# Patient Record
Sex: FEMALE | Race: WHITE | NOT HISPANIC OR LATINO
[De-identification: names, ages, dates, MRNs, and addresses within clinical notes are randomized per-mention and may not be internally consistent; named-entity substitution may affect disease eponyms.]

---

## 2017-01-18 ENCOUNTER — APPOINTMENT (OUTPATIENT)
Dept: VASCULAR SURGERY | Facility: CLINIC | Age: 62
End: 2017-01-18

## 2017-01-18 VITALS — HEIGHT: 63 IN | BODY MASS INDEX: 25.69 KG/M2 | WEIGHT: 145 LBS | RESPIRATION RATE: 16 BRPM | TEMPERATURE: 98.2 F

## 2017-01-18 VITALS — HEART RATE: 58 BPM | DIASTOLIC BLOOD PRESSURE: 76 MMHG | SYSTOLIC BLOOD PRESSURE: 118 MMHG

## 2017-01-18 DIAGNOSIS — Z87.891 PERSONAL HISTORY OF NICOTINE DEPENDENCE: ICD-10-CM

## 2017-01-31 ENCOUNTER — APPOINTMENT (OUTPATIENT)
Dept: CT IMAGING | Facility: CLINIC | Age: 62
End: 2017-01-31

## 2017-01-31 ENCOUNTER — OUTPATIENT (OUTPATIENT)
Dept: OUTPATIENT SERVICES | Facility: HOSPITAL | Age: 62
LOS: 1 days | End: 2017-01-31
Payer: COMMERCIAL

## 2017-01-31 DIAGNOSIS — Z00.8 ENCOUNTER FOR OTHER GENERAL EXAMINATION: ICD-10-CM

## 2017-01-31 PROCEDURE — 70486 CT MAXILLOFACIAL W/O DYE: CPT

## 2017-04-13 ENCOUNTER — APPOINTMENT (OUTPATIENT)
Dept: UROLOGY | Facility: CLINIC | Age: 62
End: 2017-04-13

## 2018-08-29 ENCOUNTER — APPOINTMENT (OUTPATIENT)
Dept: UROLOGY | Facility: CLINIC | Age: 63
End: 2018-08-29

## 2018-08-29 ENCOUNTER — OUTPATIENT (OUTPATIENT)
Dept: OUTPATIENT SERVICES | Facility: HOSPITAL | Age: 63
LOS: 1 days | End: 2018-08-29
Payer: COMMERCIAL

## 2018-08-29 ENCOUNTER — APPOINTMENT (OUTPATIENT)
Dept: CT IMAGING | Facility: CLINIC | Age: 63
End: 2018-08-29
Payer: COMMERCIAL

## 2018-08-29 DIAGNOSIS — Z00.8 ENCOUNTER FOR OTHER GENERAL EXAMINATION: ICD-10-CM

## 2018-08-29 PROCEDURE — 74178 CT ABD&PLV WO CNTR FLWD CNTR: CPT

## 2018-08-29 PROCEDURE — 82565 ASSAY OF CREATININE: CPT

## 2018-08-29 PROCEDURE — 74178 CT ABD&PLV WO CNTR FLWD CNTR: CPT | Mod: 26

## 2020-02-20 ENCOUNTER — APPOINTMENT (OUTPATIENT)
Dept: INTERNAL MEDICINE | Facility: CLINIC | Age: 65
End: 2020-02-20

## 2021-03-01 ENCOUNTER — NON-APPOINTMENT (OUTPATIENT)
Age: 66
End: 2021-03-01

## 2021-03-11 ENCOUNTER — NON-APPOINTMENT (OUTPATIENT)
Age: 66
End: 2021-03-11

## 2021-03-11 ENCOUNTER — APPOINTMENT (OUTPATIENT)
Dept: GYNECOLOGIC ONCOLOGY | Facility: CLINIC | Age: 66
End: 2021-03-11
Payer: COMMERCIAL

## 2021-03-11 VITALS
DIASTOLIC BLOOD PRESSURE: 81 MMHG | BODY MASS INDEX: 25.52 KG/M2 | SYSTOLIC BLOOD PRESSURE: 128 MMHG | HEART RATE: 72 BPM | WEIGHT: 144 LBS | HEIGHT: 63 IN

## 2021-03-11 DIAGNOSIS — R73.03 PREDIABETES.: ICD-10-CM

## 2021-03-11 DIAGNOSIS — R31.29 OTHER MICROSCOPIC HEMATURIA: ICD-10-CM

## 2021-03-11 DIAGNOSIS — Z80.49 FAMILY HISTORY OF MALIGNANT NEOPLASM OF OTHER GENITAL ORGANS: ICD-10-CM

## 2021-03-11 DIAGNOSIS — E78.5 HYPERLIPIDEMIA, UNSPECIFIED: ICD-10-CM

## 2021-03-11 DIAGNOSIS — I83.93 ASYMPTOMATIC VARICOSE VEINS OF BILATERAL LOWER EXTREMITIES: ICD-10-CM

## 2021-03-11 DIAGNOSIS — Z87.42 PERSONAL HISTORY OF OTHER DISEASES OF THE FEMALE GENITAL TRACT: ICD-10-CM

## 2021-03-11 DIAGNOSIS — N28.1 CYST OF KIDNEY, ACQUIRED: ICD-10-CM

## 2021-03-11 DIAGNOSIS — Z86.79 PERSONAL HISTORY OF OTHER DISEASES OF THE CIRCULATORY SYSTEM: ICD-10-CM

## 2021-03-11 PROCEDURE — 99204 OFFICE O/P NEW MOD 45 MIN: CPT | Mod: 25

## 2021-03-11 PROCEDURE — 99072 ADDL SUPL MATRL&STAF TM PHE: CPT

## 2021-03-11 PROCEDURE — 56820 COLPOSCOPY VULVA: CPT

## 2021-03-11 RX ORDER — ICOSAPENT ETHYL 500 MG/1
CAPSULE ORAL
Refills: 0 | Status: ACTIVE | COMMUNITY

## 2021-03-15 ENCOUNTER — RESULT REVIEW (OUTPATIENT)
Age: 66
End: 2021-03-15

## 2021-03-15 ENCOUNTER — OUTPATIENT (OUTPATIENT)
Dept: OUTPATIENT SERVICES | Facility: HOSPITAL | Age: 66
LOS: 1 days | End: 2021-03-15
Payer: COMMERCIAL

## 2021-03-15 DIAGNOSIS — C51.9 MALIGNANT NEOPLASM OF VULVA, UNSPECIFIED: ICD-10-CM

## 2021-03-15 PROCEDURE — 88321 CONSLTJ&REPRT SLD PREP ELSWR: CPT

## 2021-03-16 LAB — SURGICAL PATHOLOGY STUDY: SIGNIFICANT CHANGE UP

## 2021-03-24 ENCOUNTER — OUTPATIENT (OUTPATIENT)
Dept: OUTPATIENT SERVICES | Facility: HOSPITAL | Age: 66
LOS: 1 days | End: 2021-03-24
Payer: COMMERCIAL

## 2021-03-24 VITALS
SYSTOLIC BLOOD PRESSURE: 125 MMHG | OXYGEN SATURATION: 96 % | HEIGHT: 63 IN | HEART RATE: 68 BPM | DIASTOLIC BLOOD PRESSURE: 80 MMHG | RESPIRATION RATE: 18 BRPM | WEIGHT: 143.08 LBS | TEMPERATURE: 98 F

## 2021-03-24 DIAGNOSIS — E11.9 TYPE 2 DIABETES MELLITUS WITHOUT COMPLICATIONS: ICD-10-CM

## 2021-03-24 DIAGNOSIS — C51.9 MALIGNANT NEOPLASM OF VULVA, UNSPECIFIED: ICD-10-CM

## 2021-03-24 DIAGNOSIS — Z29.9 ENCOUNTER FOR PROPHYLACTIC MEASURES, UNSPECIFIED: ICD-10-CM

## 2021-03-24 DIAGNOSIS — Z01.818 ENCOUNTER FOR OTHER PREPROCEDURAL EXAMINATION: ICD-10-CM

## 2021-03-24 LAB
A1C WITH ESTIMATED AVERAGE GLUCOSE RESULT: 6 % — HIGH (ref 4–5.6)
ANION GAP SERPL CALC-SCNC: 14 MMOL/L — SIGNIFICANT CHANGE UP (ref 5–17)
BLD GP AB SCN SERPL QL: NEGATIVE — SIGNIFICANT CHANGE UP
BUN SERPL-MCNC: 13 MG/DL — SIGNIFICANT CHANGE UP (ref 7–23)
CALCIUM SERPL-MCNC: 9.2 MG/DL — SIGNIFICANT CHANGE UP (ref 8.4–10.5)
CHLORIDE SERPL-SCNC: 100 MMOL/L — SIGNIFICANT CHANGE UP (ref 96–108)
CO2 SERPL-SCNC: 24 MMOL/L — SIGNIFICANT CHANGE UP (ref 22–31)
CREAT SERPL-MCNC: 0.54 MG/DL — SIGNIFICANT CHANGE UP (ref 0.5–1.3)
ESTIMATED AVERAGE GLUCOSE: 126 MG/DL — HIGH (ref 68–114)
GLUCOSE SERPL-MCNC: 165 MG/DL — HIGH (ref 70–99)
HCT VFR BLD CALC: 39.9 % — SIGNIFICANT CHANGE UP (ref 34.5–45)
HGB BLD-MCNC: 13.3 G/DL — SIGNIFICANT CHANGE UP (ref 11.5–15.5)
MCHC RBC-ENTMCNC: 29.9 PG — SIGNIFICANT CHANGE UP (ref 27–34)
MCHC RBC-ENTMCNC: 33.3 GM/DL — SIGNIFICANT CHANGE UP (ref 32–36)
MCV RBC AUTO: 89.7 FL — SIGNIFICANT CHANGE UP (ref 80–100)
NRBC # BLD: 0 /100 WBCS — SIGNIFICANT CHANGE UP (ref 0–0)
PLATELET # BLD AUTO: 296 K/UL — SIGNIFICANT CHANGE UP (ref 150–400)
POTASSIUM SERPL-MCNC: 3.8 MMOL/L — SIGNIFICANT CHANGE UP (ref 3.5–5.3)
POTASSIUM SERPL-SCNC: 3.8 MMOL/L — SIGNIFICANT CHANGE UP (ref 3.5–5.3)
RBC # BLD: 4.45 M/UL — SIGNIFICANT CHANGE UP (ref 3.8–5.2)
RBC # FLD: 13.4 % — SIGNIFICANT CHANGE UP (ref 10.3–14.5)
RH IG SCN BLD-IMP: POSITIVE — SIGNIFICANT CHANGE UP
SODIUM SERPL-SCNC: 138 MMOL/L — SIGNIFICANT CHANGE UP (ref 135–145)
WBC # BLD: 4.79 K/UL — SIGNIFICANT CHANGE UP (ref 3.8–10.5)
WBC # FLD AUTO: 4.79 K/UL — SIGNIFICANT CHANGE UP (ref 3.8–10.5)

## 2021-03-24 PROCEDURE — 86850 RBC ANTIBODY SCREEN: CPT

## 2021-03-24 PROCEDURE — 83036 HEMOGLOBIN GLYCOSYLATED A1C: CPT

## 2021-03-24 PROCEDURE — G0463: CPT

## 2021-03-24 PROCEDURE — 80048 BASIC METABOLIC PNL TOTAL CA: CPT

## 2021-03-24 PROCEDURE — 86901 BLOOD TYPING SEROLOGIC RH(D): CPT

## 2021-03-24 PROCEDURE — 85027 COMPLETE CBC AUTOMATED: CPT

## 2021-03-24 PROCEDURE — 86900 BLOOD TYPING SEROLOGIC ABO: CPT

## 2021-03-24 RX ORDER — SODIUM CHLORIDE 9 MG/ML
3 INJECTION INTRAMUSCULAR; INTRAVENOUS; SUBCUTANEOUS EVERY 8 HOURS
Refills: 0 | Status: DISCONTINUED | OUTPATIENT
Start: 2021-04-07 | End: 2021-04-21

## 2021-03-24 RX ORDER — LIDOCAINE HCL 20 MG/ML
0.2 VIAL (ML) INJECTION ONCE
Refills: 0 | Status: DISCONTINUED | OUTPATIENT
Start: 2021-04-07 | End: 2021-04-21

## 2021-03-24 NOTE — H&P PST ADULT - NSICDXPASTMEDICALHX_GEN_ALL_CORE_FT
PAST MEDICAL HISTORY:  Anxiety     Degenerative lumbar disc     Diabetes mellitus     H/O hyperlipidemia     HTN - Hypertension     Hypertriglyceridemia     Vulvar cancer

## 2021-03-24 NOTE — H&P PST ADULT - MUSCULOSKELETAL
ROM intact/no joint swelling/no joint warmth/no calf tenderness/normal strength detailed exam negative

## 2021-03-24 NOTE — H&P PST ADULT - HISTORY OF PRESENT ILLNESS
65 YEAR OLD FEMALE WITH PMH, HLD, HTN, DM WITH MALIGNANT NEOPLASM OF VULVA. SHE PRESENTS TO PAT TODAY FOR EVALUATION PRIOR TO SCHEDULED ERP, PARTIAL SIMPLE VULVECTOMY ON 4/7/2021, SHE DENIES FEVER, CHILLS, PRIOR COVID INFECTION, RECENT TRAVEL OR SICK CONTACTS.    PREOP COVID SCREENING 4/4 AT [facility]

## 2021-03-24 NOTE — H&P PST ADULT - NS PRO REGISTERED ORGAN DONOR
Last med visit 2/14/18-advised follow up HTN in 3 months. Has supervisit sched 6/13/2018  Hydrocodone last ordered 4/23/18-#90 no RF-sig q 8 hrs prn  **see med refill order pended for review** thanks! No

## 2021-03-24 NOTE — H&P PST ADULT - NSICDXPROBLEM_GEN_ALL_CORE_FT
PROBLEM DIAGNOSES  Problem: Malignant neoplasm of vulva  Assessment and Plan: ERP, Partial Simple Vulvectomy  -cbc, bmp, A1c, type and screen done at [facility]  --medical clearance 3/16  -preop instructions  -ERP protocol  -ABO DOS      Problem: Diabetes mellitus  Assessment and Plan: -patient instructed to hold metformin after 4/5 evening dose  -fingerstick DOS    Problem: Need for prophylactic measure  Assessment and Plan: .The Caprini score indicates that this patient is at high risk for a VTE event (score 6 or greater). Surgical patients in this group will benefit from both pharmacologic prophylaxis and intermittent compression devices.  The surgical team will determine the balance between VTE risk and bleeding risk, and other clinical considerations

## 2021-03-24 NOTE — H&P PST ADULT - ASSESSMENT
CHAVAI VTE 2.0 SCORE [CLOT updated 2019]    AGE RELATED RISK FACTORS                                                       MOBILITY RELATED FACTORS  [ ] Age 41-60 years                                            (1 Point)                    [ ] Bed rest                                                        (1 Point)  [x] Age: 61-74 years                                           (2 Points)                  [ ] Plaster cast                                                   (2 Points)  [ ] Age= 75 years                                              (3 Points)                    [ ] Bed bound for more than 72 hours                 (2 Points)    DISEASE RELATED RISK FACTORS                                               GENDER SPECIFIC FACTORS  [ ] Edema in the lower extremities                       (1 Point)              [ ] Pregnancy                                                     (1 Point)  [ ] Varicose veins                                               (1 Point)                     [ ] Post-partum < 6 weeks                                   (1 Point)             [ ] BMI > 25 Kg/m2                                            (1 Point)                     [ ] Hormonal therapy  or oral contraception          (1 Point)                 [ ] Sepsis (in the previous month)                        (1 Point)               [ ] History of pregnancy complications                 (1 point)  [ ] Pneumonia or serious lung disease                                               [ ] Unexplained or recurrent                     (1 Point)           (in the previous month)                               (1 Point)  [ ] Abnormal pulmonary function test                     (1 Point)                 SURGERY RELATED RISK FACTORS  [ ] Acute myocardial infarction                              (1 Point)               [ ]  Section                                             (1 Point)  [ ] Congestive heart failure (in the previous month)  (1 Point)      [ ] Minor surgery                                                  (1 Point)   [ ] Inflammatory bowel disease                             (1 Point)               [ ] Arthroscopic surgery                                        (2 Points)  [ ] Central venous access                                      (2 Points)                [x] General surgery lasting more than 45 minutes (2 points)  [x] Malignancy- Present or previous                   (2 Points)                [ ] Elective arthroplasty                                         (5 points)    [ ] Stroke (in the previous month)                          (5 Points)                                                                                                                                                           HEMATOLOGY RELATED FACTORS                                                 TRAUMA RELATED RISK FACTORS  [ ] Prior episodes of VTE                                     (3 Points)                [ ] Fracture of the hip, pelvis, or leg                       (5 Points)  [ ] Positive family history for VTE                         (3 Points)             [ ] Acute spinal cord injury (in the previous month)  (5 Points)  [ ] Prothrombin 24456 A                                     (3 Points)               [ ] Paralysis  (less than 1 month)                             (5 Points)  [ ] Factor V Leiden                                             (3 Points)                  [ ] Multiple Trauma within 1 month                        (5 Points)  [ ] Lupus anticoagulants                                     (3 Points)                                                           [ ] Anticardiolipin antibodies                               (3 Points)                                                       [ ] High homocysteine in the blood                      (3 Points)                                             [ ] Other congenital or acquired thrombophilia      (3 Points)                                                [ ] Heparin induced thrombocytopenia                  (3 Points)                                     Total Score [ 6]

## 2021-03-24 NOTE — H&P PST ADULT - NSICDXPASTSURGICALHX_GEN_ALL_CORE_FT
PAST SURGICAL HISTORY:  S/P  Section x 2  After 2nd delivery was post partum pre-eclamptic    S/P cone biopsy of cervix benign    S/P D&C x 2  for heavy bleeding    S/P tonsillectomy

## 2021-04-04 ENCOUNTER — OUTPATIENT (OUTPATIENT)
Dept: OUTPATIENT SERVICES | Facility: HOSPITAL | Age: 66
LOS: 1 days | End: 2021-04-04
Payer: COMMERCIAL

## 2021-04-04 DIAGNOSIS — Z11.52 ENCOUNTER FOR SCREENING FOR COVID-19: ICD-10-CM

## 2021-04-04 PROBLEM — C51.9 MALIGNANT NEOPLASM OF VULVA, UNSPECIFIED: Chronic | Status: ACTIVE | Noted: 2021-03-24

## 2021-04-04 PROBLEM — M51.36 OTHER INTERVERTEBRAL DISC DEGENERATION, LUMBAR REGION: Chronic | Status: ACTIVE | Noted: 2021-03-24

## 2021-04-04 PROBLEM — E11.9 TYPE 2 DIABETES MELLITUS WITHOUT COMPLICATIONS: Chronic | Status: ACTIVE | Noted: 2021-03-24

## 2021-04-04 LAB — SARS-COV-2 RNA SPEC QL NAA+PROBE: SIGNIFICANT CHANGE UP

## 2021-04-04 PROCEDURE — C9803: CPT

## 2021-04-04 PROCEDURE — U0005: CPT

## 2021-04-04 PROCEDURE — U0003: CPT

## 2021-04-05 ENCOUNTER — NON-APPOINTMENT (OUTPATIENT)
Age: 66
End: 2021-04-05

## 2021-04-06 ENCOUNTER — TRANSCRIPTION ENCOUNTER (OUTPATIENT)
Age: 66
End: 2021-04-06

## 2021-04-07 ENCOUNTER — RESULT REVIEW (OUTPATIENT)
Age: 66
End: 2021-04-07

## 2021-04-07 ENCOUNTER — APPOINTMENT (OUTPATIENT)
Dept: GYNECOLOGIC ONCOLOGY | Facility: HOSPITAL | Age: 66
End: 2021-04-07

## 2021-04-07 ENCOUNTER — OUTPATIENT (OUTPATIENT)
Dept: OUTPATIENT SERVICES | Facility: HOSPITAL | Age: 66
LOS: 1 days | End: 2021-04-07
Payer: COMMERCIAL

## 2021-04-07 VITALS
HEIGHT: 63 IN | RESPIRATION RATE: 18 BRPM | SYSTOLIC BLOOD PRESSURE: 147 MMHG | DIASTOLIC BLOOD PRESSURE: 89 MMHG | OXYGEN SATURATION: 98 % | HEART RATE: 68 BPM | TEMPERATURE: 98 F | WEIGHT: 143.08 LBS

## 2021-04-07 VITALS
SYSTOLIC BLOOD PRESSURE: 125 MMHG | HEART RATE: 76 BPM | TEMPERATURE: 98 F | RESPIRATION RATE: 18 BRPM | DIASTOLIC BLOOD PRESSURE: 68 MMHG | OXYGEN SATURATION: 99 %

## 2021-04-07 DIAGNOSIS — C51.9 MALIGNANT NEOPLASM OF VULVA, UNSPECIFIED: ICD-10-CM

## 2021-04-07 LAB — GLUCOSE BLDC GLUCOMTR-MCNC: 141 MG/DL — HIGH (ref 70–99)

## 2021-04-07 PROCEDURE — 86901 BLOOD TYPING SEROLOGIC RH(D): CPT

## 2021-04-07 PROCEDURE — 56620 VULVECTOMY SIMPLE PARTIAL: CPT

## 2021-04-07 PROCEDURE — 88307 TISSUE EXAM BY PATHOLOGIST: CPT | Mod: 26

## 2021-04-07 PROCEDURE — 88342 IMHCHEM/IMCYTCHM 1ST ANTB: CPT | Mod: 26

## 2021-04-07 PROCEDURE — 86900 BLOOD TYPING SEROLOGIC ABO: CPT

## 2021-04-07 PROCEDURE — 88305 TISSUE EXAM BY PATHOLOGIST: CPT

## 2021-04-07 PROCEDURE — 88342 IMHCHEM/IMCYTCHM 1ST ANTB: CPT

## 2021-04-07 PROCEDURE — 88305 TISSUE EXAM BY PATHOLOGIST: CPT | Mod: 26

## 2021-04-07 PROCEDURE — 86850 RBC ANTIBODY SCREEN: CPT

## 2021-04-07 PROCEDURE — 88307 TISSUE EXAM BY PATHOLOGIST: CPT

## 2021-04-07 PROCEDURE — 82962 GLUCOSE BLOOD TEST: CPT

## 2021-04-07 RX ORDER — VALACYCLOVIR 500 MG/1
0 TABLET, FILM COATED ORAL
Qty: 0 | Refills: 0 | DISCHARGE

## 2021-04-07 RX ORDER — ATENOLOL 25 MG/1
0 TABLET ORAL
Qty: 0 | Refills: 0 | DISCHARGE

## 2021-04-07 RX ORDER — IBUPROFEN 200 MG
1 TABLET ORAL
Qty: 0 | Refills: 0 | DISCHARGE

## 2021-04-07 RX ORDER — CELECOXIB 200 MG/1
400 CAPSULE ORAL ONCE
Refills: 0 | Status: COMPLETED | OUTPATIENT
Start: 2021-04-07 | End: 2021-04-07

## 2021-04-07 RX ORDER — ONDANSETRON 8 MG/1
4 TABLET, FILM COATED ORAL ONCE
Refills: 0 | Status: DISCONTINUED | OUTPATIENT
Start: 2021-04-07 | End: 2021-04-21

## 2021-04-07 RX ORDER — ACETAMINOPHEN 500 MG
1000 TABLET ORAL ONCE
Refills: 0 | Status: COMPLETED | OUTPATIENT
Start: 2021-04-07 | End: 2021-04-07

## 2021-04-07 RX ORDER — BACILLUS COAGULANS/INULIN 1B-250 MG
1 CAPSULE ORAL
Qty: 0 | Refills: 0 | DISCHARGE

## 2021-04-07 RX ORDER — ACETAMINOPHEN 500 MG
3 TABLET ORAL
Qty: 0 | Refills: 0 | DISCHARGE

## 2021-04-07 RX ORDER — SODIUM CHLORIDE 9 MG/ML
1000 INJECTION, SOLUTION INTRAVENOUS
Refills: 0 | Status: DISCONTINUED | OUTPATIENT
Start: 2021-04-07 | End: 2021-04-21

## 2021-04-07 RX ORDER — ATORVASTATIN CALCIUM 80 MG/1
1 TABLET, FILM COATED ORAL
Qty: 0 | Refills: 0 | DISCHARGE

## 2021-04-07 RX ORDER — IRBESARTAN 75 MG/1
0 TABLET ORAL
Qty: 0 | Refills: 0 | DISCHARGE

## 2021-04-07 RX ORDER — ICOSAPENT ETHYL 500 MG/1
2 CAPSULE, LIQUID FILLED ORAL
Qty: 0 | Refills: 0 | DISCHARGE

## 2021-04-07 RX ORDER — BACITRACIN ZINC 500 UNIT/G
1 OINTMENT IN PACKET (EA) TOPICAL
Qty: 0 | Refills: 0 | DISCHARGE

## 2021-04-07 RX ORDER — GABAPENTIN 400 MG/1
300 CAPSULE ORAL ONCE
Refills: 0 | Status: COMPLETED | OUTPATIENT
Start: 2021-04-07 | End: 2021-04-07

## 2021-04-07 RX ORDER — CEFAZOLIN SODIUM 1 G
2000 VIAL (EA) INJECTION ONCE
Refills: 0 | Status: COMPLETED | OUTPATIENT
Start: 2021-04-07 | End: 2021-04-07

## 2021-04-07 RX ORDER — METFORMIN HYDROCHLORIDE 850 MG/1
1 TABLET ORAL
Qty: 0 | Refills: 0 | DISCHARGE

## 2021-04-07 RX ORDER — POTASSIUM CHLORIDE 20 MEQ
2 PACKET (EA) ORAL
Qty: 0 | Refills: 0 | DISCHARGE

## 2021-04-07 RX ADMIN — CELECOXIB 400 MILLIGRAM(S): 200 CAPSULE ORAL at 11:57

## 2021-04-07 RX ADMIN — Medication 1000 MILLIGRAM(S): at 11:56

## 2021-04-07 RX ADMIN — GABAPENTIN 300 MILLIGRAM(S): 400 CAPSULE ORAL at 11:57

## 2021-04-07 NOTE — ASU PATIENT PROFILE, ADULT - PMH
Anxiety    Degenerative lumbar disc    Diabetes mellitus    H/O hyperlipidemia    HTN - Hypertension    Hypertriglyceridemia    Vulvar cancer

## 2021-04-07 NOTE — ASU PATIENT PROFILE, ADULT - PSH
S/P  Section  x 2  After 2nd delivery was post partum pre-eclamptic  S/P cone biopsy of cervix  benign  S/P D&C  x 2  for heavy bleeding  S/P tonsillectomy

## 2021-04-07 NOTE — ASU DISCHARGE PLAN (ADULT/PEDIATRIC) - CARE PROVIDER_API CALL
Jessica Givens)  Gynecologic Oncology; Obstetrics and Gynecology  [address] [place] [address]  Phone: [phone]  Fax: [phone]  Follow Up Time:

## 2021-04-07 NOTE — ASU DISCHARGE PLAN (ADULT/PEDIATRIC) - ASU DC SPECIAL INSTRUCTIONSFT
Regular diet as tolerated, regular activity as tolerated. Nothing per vagina: no intercourse, tampons or douching.  Call your provider if you experience fevers, chills, worsening abdominal pain, inability to urinate or worsening vaginal bleeding.    Alternate using Motrin 600mg every 6 hours and Tylenol 975mg (2 extra strength or 3 regular tabs) every 6 hours for pain.     Please apply the bacitracin ointment provided to the surgical site 3 to 4 times per day or after using the bathroom.

## 2021-04-07 NOTE — ASU PREOP CHECKLIST - SELECT TESTS ORDERED
Type and Screen/POCT Blood Glucose finger stick glucose 141 type and screen sent stat/Type and Screen/POCT Blood Glucose finger stick glucose 141 Dr Melton aware type and screen sent stat/Type and Screen/POCT Blood Glucose

## 2021-04-09 ENCOUNTER — NON-APPOINTMENT (OUTPATIENT)
Age: 66
End: 2021-04-09

## 2021-04-09 ENCOUNTER — EMERGENCY (EMERGENCY)
Facility: HOSPITAL | Age: 66
LOS: 1 days | Discharge: HOME | End: 2021-04-09
Attending: EMERGENCY MEDICINE
Payer: COMMERCIAL

## 2021-04-09 VITALS
DIASTOLIC BLOOD PRESSURE: 112 MMHG | HEIGHT: 63 IN | SYSTOLIC BLOOD PRESSURE: 192 MMHG | TEMPERATURE: 98 F | WEIGHT: 139.99 LBS | HEART RATE: 74 BPM | OXYGEN SATURATION: 98 % | RESPIRATION RATE: 20 BRPM

## 2021-04-09 PROCEDURE — 99285 EMERGENCY DEPT VISIT HI MDM: CPT

## 2021-04-09 PROCEDURE — 93010 ELECTROCARDIOGRAM REPORT: CPT

## 2021-04-09 NOTE — ED ADULT TRIAGE NOTE - CHIEF COMPLAINT QUOTE
elev bp and dizziness since 730pm, nausea, dry mouth and diarrhea  denies weakness/cp/sob  negative BEFAST

## 2021-04-09 NOTE — ED ADULT TRIAGE NOTE - HEIGHT IN CM
Illness Evaluation                  2/12/2021     ALLERGIES:   Allergen Reactions   • Sulfa Antibiotics HIVES          • Adhesive   (Environmental) HIVES            Current Outpatient Medications   Medication Sig Dispense Refill   • tramadol-acetaminophen (ULTRACET) 37.5-325 MG per tablet Take 1 tablet by mouth every 6 hours as needed for Pain. 30 tablet 0   • gabapentin (NEURONTIN) 100 MG capsule Take 1 capsule by mouth 2 times daily. 60 capsule 3   • LORazepam (ATIVAN) 0.5 MG tablet Take 1 tablet by mouth daily as needed for Anxiety. 30 tablet 3   • MULTIPLE VITAMIN PO Take by mouth daily.     • Ascorbic Acid (VITAMIN C) 100 MG tablet Take by mouth daily.     • Digestive Enzymes Cap Take by mouth as needed.      • Omega-3 Fatty Acids (FISH OIL) 500 MG capsule Take by mouth daily.     • omeprazole (PRILOSEC) 20 MG capsule Take by mouth daily.        No current facility-administered medications for this visit.      Patient Active Problem List   Diagnosis   • Anxiety   • Diverticulitis of intestine   • Emphysematous bleb of lung (CMS/HCC)   • Gastroesophageal reflux disease   • Hodgkin's disease (CMS/HCC)   • Hyperlipidemia   • Rectovaginal fistula     Subjective:  Ashlyn Denney is a 66 year old female who presents today with a complaint of rib pain which has been present for weeks. Symptoms are worsening. Present treatments: none.  On 11/4/20 went to chiropractor. Her normal chiro was off that day. Sites that the substitute chiro fractured her ribs. 2/3/21 had a different chiro again adjusted her. She notes to have a a flared up problems in her upper back and ribs. Pain with bending over, laying on side. Ok when standing up. Has not adjusted upper back prior to 2/3/21. Pain all the way around her torso.   Tried Tylenol extra strength without help, has not tried any NSAIDs siting that she gets a 'blood venancio' on her face so she avoids (of note, this writer is not clear what patient is referring to).  Goes to [facility] out of [place] .  Xrays taken on 2/3/21 and again sometime between then and now. No fracture at this time shown.     Needs note for work.     Review of Systems  Objective:  /70   Pulse (!) 109   Temp 96.8 °F (36 °C) (Temporal)   Ht 5' 4\" (1.626 m)   Wt 79.4 kg (175 lb)   LMP  (LMP Unknown)   BMI 30.04 kg/m²   BSA 1.85 m²   Physical Exam  Constitutional:       General: She is not in acute distress.     Appearance: She is well-developed. She is not ill-appearing.   HENT:      Head: Normocephalic.      Mouth/Throat:      Mouth: Mucous membranes are not pale and not dry.   Eyes:      General: Lids are normal.      Extraocular Movements:      Right eye: Normal extraocular motion.      Left eye: Normal extraocular motion.   Neck:      Musculoskeletal: Normal range of motion.   Skin:     General: Skin is warm.   Neurological:      Mental Status: She is alert.   Psychiatric:         Mood and Affect: Mood is not anxious or depressed.         Speech: Speech normal. Speech is not slurred.         Behavior: Behavior normal. Behavior is cooperative.     Assessment/Plan:  Back/torso pain -mostly managed through chiropractic office with a couple of communications through this office with regards to medication refill and 1 telephone message dated 11/18/2020 after initial injury.  Initially patient states that she wants to be off work for several more weeks.  Discussed with patient that, at this time that would not be appropriate as this is the first time that we are dealing with this issue from her most recent flare up on February 3.  Patient has not worked since February 3.  Did write patient note to be off work today for appointment.  Patient still wanting to to care through chiropractor's office.  Strongly recommended that patient no longer seek adjustments. It would appear to this writer that she would not be a good candidate for adjustments as she has experienced a broken rib back in November from this  and, more recently, had a \"flare up\" of previous injury after an adjustment.  If she is asking for extended time off of work this would need to come, at this time, from the treating office (chiropractor).  Discussed with patient that if chiropractor does not feel comfortable writing this note that we can take over care however she will need to follow our guidelines of seeing physical therapy and likely pain management.  Lengthy discussion with patient regarding pain management.  Patient is very concerned that she will be in pain.  Requesting tramadol.  Discussed with patient that tramadol is not our first go to in pain management, especially when other, less potent modalities have not been attempted like ibuprofen.  Review of records show that patient has a prescription for meloxicam from her orthopedic doctor.  Patient notes that she has not taken any of this.  Discussed with patient that this would be the first step in helping to reduce her inflammation and pain.  Patient concerned that meloxicam is not specifically a pain medication and lengthy discussion was had.   Did offer patient 10 pills of tramadol-acetaminophen to be taken at nighttime only as needed.  During the day recommended the meloxicam and if she is not taking the meloxicam recommended ibuprofen or acetaminophen.   She is to contact the office if she no longer wishes to pursue only chiropractic care for her pain.  Neck step would be to see physical therapy and possibly pain management.  Stressed to patient several times that it does not appear that she is a good candidate for manipulation.  Discussed that if she would like to go there for ultrasound therapy, stretching, muscle stim that that may be more appropriate but would strongly and highly recommend that she not get chiropractic adjustments.  Instructed to call if the problem worsens or does not improve.  Schedule follow-up: With chiropractic office unless she wishes to start care thru  [facility].  May contact our office if she would like to start care through us and we will start that process.  Kathie De La Cruz PA-C 2/12/2021 10:07 AM    Provider billing: Supervising Provider: ROBERTO    160.02

## 2021-04-10 VITALS
OXYGEN SATURATION: 98 % | DIASTOLIC BLOOD PRESSURE: 72 MMHG | TEMPERATURE: 98 F | RESPIRATION RATE: 16 BRPM | HEART RATE: 62 BPM | SYSTOLIC BLOOD PRESSURE: 136 MMHG

## 2021-04-10 LAB
ALBUMIN SERPL ELPH-MCNC: 5 G/DL — SIGNIFICANT CHANGE UP (ref 3.3–5)
ALP SERPL-CCNC: 47 U/L — SIGNIFICANT CHANGE UP (ref 40–120)
ALT FLD-CCNC: 39 U/L — SIGNIFICANT CHANGE UP (ref 10–45)
ANION GAP SERPL CALC-SCNC: 14 MMOL/L — SIGNIFICANT CHANGE UP (ref 5–17)
APTT BLD: 29.7 SEC — SIGNIFICANT CHANGE UP (ref 27.5–35.5)
AST SERPL-CCNC: 29 U/L — SIGNIFICANT CHANGE UP (ref 10–40)
BASE EXCESS BLDV CALC-SCNC: 3.6 MMOL/L — HIGH (ref -2–2)
BASOPHILS # BLD AUTO: 0.04 K/UL — SIGNIFICANT CHANGE UP (ref 0–0.2)
BASOPHILS NFR BLD AUTO: 0.7 % — SIGNIFICANT CHANGE UP (ref 0–2)
BILIRUB SERPL-MCNC: 1.1 MG/DL — SIGNIFICANT CHANGE UP (ref 0.2–1.2)
BUN SERPL-MCNC: 14 MG/DL — SIGNIFICANT CHANGE UP (ref 7–23)
CALCIUM SERPL-MCNC: 9.7 MG/DL — SIGNIFICANT CHANGE UP (ref 8.4–10.5)
CHLORIDE SERPL-SCNC: 98 MMOL/L — SIGNIFICANT CHANGE UP (ref 96–108)
CO2 BLDV-SCNC: 30 MMOL/L — SIGNIFICANT CHANGE UP (ref 22–30)
CO2 SERPL-SCNC: 25 MMOL/L — SIGNIFICANT CHANGE UP (ref 22–31)
CREAT SERPL-MCNC: 0.53 MG/DL — SIGNIFICANT CHANGE UP (ref 0.5–1.3)
EOSINOPHIL # BLD AUTO: 0.07 K/UL — SIGNIFICANT CHANGE UP (ref 0–0.5)
EOSINOPHIL NFR BLD AUTO: 1.1 % — SIGNIFICANT CHANGE UP (ref 0–6)
GAS PNL BLDV: SIGNIFICANT CHANGE UP
GLUCOSE SERPL-MCNC: 176 MG/DL — HIGH (ref 70–99)
HCO3 BLDV-SCNC: 29 MMOL/L — SIGNIFICANT CHANGE UP (ref 21–29)
HCT VFR BLD CALC: 42.6 % — SIGNIFICANT CHANGE UP (ref 34.5–45)
HGB BLD-MCNC: 14.3 G/DL — SIGNIFICANT CHANGE UP (ref 11.5–15.5)
IMM GRANULOCYTES NFR BLD AUTO: 0.2 % — SIGNIFICANT CHANGE UP (ref 0–1.5)
INR BLD: 0.9 RATIO — SIGNIFICANT CHANGE UP (ref 0.88–1.16)
LYMPHOCYTES # BLD AUTO: 2.01 K/UL — SIGNIFICANT CHANGE UP (ref 1–3.3)
LYMPHOCYTES # BLD AUTO: 32.9 % — SIGNIFICANT CHANGE UP (ref 13–44)
MCHC RBC-ENTMCNC: 30.4 PG — SIGNIFICANT CHANGE UP (ref 27–34)
MCHC RBC-ENTMCNC: 33.6 GM/DL — SIGNIFICANT CHANGE UP (ref 32–36)
MCV RBC AUTO: 90.6 FL — SIGNIFICANT CHANGE UP (ref 80–100)
MONOCYTES # BLD AUTO: 0.45 K/UL — SIGNIFICANT CHANGE UP (ref 0–0.9)
MONOCYTES NFR BLD AUTO: 7.4 % — SIGNIFICANT CHANGE UP (ref 2–14)
NEUTROPHILS # BLD AUTO: 3.53 K/UL — SIGNIFICANT CHANGE UP (ref 1.8–7.4)
NEUTROPHILS NFR BLD AUTO: 57.7 % — SIGNIFICANT CHANGE UP (ref 43–77)
NRBC # BLD: 0 /100 WBCS — SIGNIFICANT CHANGE UP (ref 0–0)
PCO2 BLDV: 49 MMHG — HIGH (ref 39–42)
PH BLDV: 7.39 — SIGNIFICANT CHANGE UP (ref 7.35–7.45)
PLATELET # BLD AUTO: 322 K/UL — SIGNIFICANT CHANGE UP (ref 150–400)
PO2 BLDV: 20 MMHG — LOW (ref 25–45)
POTASSIUM SERPL-MCNC: 4 MMOL/L — SIGNIFICANT CHANGE UP (ref 3.5–5.3)
POTASSIUM SERPL-SCNC: 4 MMOL/L — SIGNIFICANT CHANGE UP (ref 3.5–5.3)
PROT SERPL-MCNC: 7.5 G/DL — SIGNIFICANT CHANGE UP (ref 6–8.3)
PROTHROM AB SERPL-ACNC: 10.9 SEC — SIGNIFICANT CHANGE UP (ref 10.6–13.6)
RBC # BLD: 4.7 M/UL — SIGNIFICANT CHANGE UP (ref 3.8–5.2)
RBC # FLD: 13.2 % — SIGNIFICANT CHANGE UP (ref 10.3–14.5)
SAO2 % BLDV: 29 % — LOW (ref 67–88)
SODIUM SERPL-SCNC: 137 MMOL/L — SIGNIFICANT CHANGE UP (ref 135–145)
TROPONIN T, HIGH SENSITIVITY RESULT: <6 NG/L — SIGNIFICANT CHANGE UP (ref 0–51)
WBC # BLD: 6.11 K/UL — SIGNIFICANT CHANGE UP (ref 3.8–10.5)
WBC # FLD AUTO: 6.11 K/UL — SIGNIFICANT CHANGE UP (ref 3.8–10.5)

## 2021-04-10 PROCEDURE — 70450 CT HEAD/BRAIN W/O DYE: CPT

## 2021-04-10 PROCEDURE — 70450 CT HEAD/BRAIN W/O DYE: CPT | Mod: 26,MA,59

## 2021-04-10 PROCEDURE — 82962 GLUCOSE BLOOD TEST: CPT

## 2021-04-10 PROCEDURE — 99284 EMERGENCY DEPT VISIT MOD MDM: CPT | Mod: 25

## 2021-04-10 PROCEDURE — 80053 COMPREHEN METABOLIC PANEL: CPT

## 2021-04-10 PROCEDURE — 84484 ASSAY OF TROPONIN QUANT: CPT

## 2021-04-10 PROCEDURE — 85610 PROTHROMBIN TIME: CPT

## 2021-04-10 PROCEDURE — 96374 THER/PROPH/DIAG INJ IV PUSH: CPT | Mod: XU

## 2021-04-10 PROCEDURE — 83735 ASSAY OF MAGNESIUM: CPT

## 2021-04-10 PROCEDURE — 85730 THROMBOPLASTIN TIME PARTIAL: CPT

## 2021-04-10 PROCEDURE — 84100 ASSAY OF PHOSPHORUS: CPT

## 2021-04-10 PROCEDURE — 70498 CT ANGIOGRAPHY NECK: CPT | Mod: 26,MA

## 2021-04-10 PROCEDURE — 93005 ELECTROCARDIOGRAM TRACING: CPT

## 2021-04-10 PROCEDURE — 83690 ASSAY OF LIPASE: CPT

## 2021-04-10 PROCEDURE — 70498 CT ANGIOGRAPHY NECK: CPT

## 2021-04-10 PROCEDURE — 70496 CT ANGIOGRAPHY HEAD: CPT | Mod: 26,MA

## 2021-04-10 PROCEDURE — 70496 CT ANGIOGRAPHY HEAD: CPT

## 2021-04-10 PROCEDURE — 83036 HEMOGLOBIN GLYCOSYLATED A1C: CPT

## 2021-04-10 PROCEDURE — 85025 COMPLETE CBC W/AUTO DIFF WBC: CPT

## 2021-04-10 PROCEDURE — 82803 BLOOD GASES ANY COMBINATION: CPT

## 2021-04-10 RX ORDER — SODIUM CHLORIDE 9 MG/ML
1000 INJECTION, SOLUTION INTRAVENOUS ONCE
Refills: 0 | Status: COMPLETED | OUTPATIENT
Start: 2021-04-10 | End: 2021-04-10

## 2021-04-10 RX ORDER — ONDANSETRON 8 MG/1
4 TABLET, FILM COATED ORAL ONCE
Refills: 0 | Status: COMPLETED | OUTPATIENT
Start: 2021-04-10 | End: 2021-04-10

## 2021-04-10 RX ADMIN — ONDANSETRON 4 MILLIGRAM(S): 8 TABLET, FILM COATED ORAL at 01:06

## 2021-04-10 RX ADMIN — SODIUM CHLORIDE 1000 MILLILITER(S): 9 INJECTION, SOLUTION INTRAVENOUS at 01:06

## 2021-04-10 NOTE — ED PROVIDER NOTE - ATTENDING CONTRIBUTION TO CARE
------------ATTENDING NOTE------------   pt c/o dizziness, describing feeling lightheaded, associated w/ nausea and decreased PO, had looser nonbloody stools, no fevers, mild epigastric discomfort/burning, felt palpitations describes as gradually increasing rate, no chest pain or sob, Code Stroke by triage, cleared Neuro, feels significantly better -->  - Mark Parisi MD   ----------------------------------------------

## 2021-04-10 NOTE — ED PROVIDER NOTE - OBJECTIVE STATEMENT
65 yoF, PMHx of HTN, vulvar CA, DM, anxiety presenting to ED for dizziness, diarrhea, nausea. Activated as code stroke for dizziness. Seen after neuro eval/neuro imaging.  Pt states started to feel dizzy around 20:00. Had several episodes of diarrhea. Nausea without vomiting. Denies any chest, back, abd pain. Denies any focal neuro sx. No vertigo. Able to ambulate. Felt well recently and this am. Had takeout dinner, tuna from restaurant. No hx of TIA/CVA. Follows regularly with doctors including cardiology for routine screening with reassuring testing in the past. Upon my eval feels much improved. Denies any urinary sx. Did have vulvar bx sometime earlier this month with GYN. No major complications.

## 2021-04-10 NOTE — CONSULT NOTE ADULT - ASSESSMENT
Assessment: 65 year old right-handed F with a PMH of HTN and borderline DM who presented on 4/10 with lightheadedness, nausea, and vomiting. LKN 20:00 on 4/9. Initial vital signs significant for BP of 192/112. Physical exam nonfocal. Patient not a tPA candidate due to mild nondisabling deficits. Patient not an endovascular candidate due to no LVO.      Assessment: 65 year old right-handed F with a PMH of HTN and borderline DM who presented on 4/10 with lightheadedness, nausea, and vomiting. LKN 20:00 on 4/9. Initial vital signs significant for BP of 192/112. Physical exam nonfocal. Patient not a tPA candidate due to mild nondisabling deficits. Patient not an endovascular candidate due to no LVO.     Impression: Lightheadedness/nausea possibly from hypertensive urgency, cardiogenic etiology. Low suspicion for acute neurovascular event    Plan:  Investigation of lightheadedness/nausea/diarrhea per primary team  Consider checking orthostatics, CBC/CMP, Mg, Phos, EKG  No further inpatient neurologic workup at this time    Case to be discussed with neurology attending, Dr. Clark     Assessment: 65 year old right-handed F with a PMH of HTN and borderline DM who presented on 4/10 with lightheadedness, nausea, and vomiting. LKN 20:00 on 4/9. Initial vital signs significant for BP of 192/112. Physical exam nonfocal. Patient not a tPA candidate due to mild nondisabling deficits. Patient not an endovascular candidate due to no LVO.     Impression: Lightheadedness/nausea possibly from hypertensive urgency, cardiogenic etiology. Low suspicion for acute neurovascular event    Plan:  Investigation of lightheadedness/nausea/vomiting per primary team  Consider checking orthostatics, CBC/CMP, Mg, Phos, EKG  No further inpatient neurologic workup at this time    Case discussed with neurology attending, Dr. Kamara

## 2021-04-10 NOTE — ED PROVIDER NOTE - NSFOLLOWUPINSTRUCTIONS_ED_ALL_ED_FT
See your Primary Doctor this week for follow up -- call to discuss.    Stay well hydrated, eat regular healthy meals, get plenty of rest.    See NEAR SYNCOPE and DYSPEPSIA information and return instructions given to you.    Seek immediate medical care for new/worsening symptoms/concerns.

## 2021-04-10 NOTE — ED PROVIDER NOTE - PROGRESS NOTE DETAILS
Alma Delia, PGY3- workup reassuring. Tolerating PO. Stable for d/c. Advised on gastritis treatment. Given return precautions.

## 2021-04-10 NOTE — CONSULT NOTE ADULT - SUBJECTIVE AND OBJECTIVE BOX
HPI: 65 year old right-handed F with a PMH of HTN and borderline DM who presented on 4/10 with lightheadedness, nausea, and vomiting. LKN 20:00 on . She found that her blood pressure was elevated at home. Patient denies weakness, numbness, vision changes, dysarthria, or headache.       NIHSS: 0  MRS: 0    REVIEW OF SYSTEMS  A 10-system ROS was performed and is negative except for those items noted above and/or in the HPI.    PAST MEDICAL & SURGICAL HISTORY:  H/O hyperlipidemia    HTN - Hypertension    Hypertriglyceridemia    Anxiety    Diabetes mellitus    Vulvar cancer    Degenerative lumbar disc    S/P  Section  x 2  After 2nd delivery was post partum pre-eclamptic    S/P D&amp;C  x 2  for heavy bleeding    S/P tonsillectomy    S/P cone biopsy of cervix  benign      FAMILY HISTORY:    SOCIAL HISTORY:   T/E/D:   Occupation:   Lives with:     MEDICATIONS (HOME):  Home Medications:  atenolol 50 mg oral tablet: orally 2 times a day (2021 11:53)  atorvastatin 40 mg oral tablet: 1 tab(s) orally once a day at bedtime (2021 11:53)  bacitracin 500 units/g topical ointment: Apply topically to affected area 3 to 4 times a day (2021 14:26)  clobetasol 0.05% topical ointment: Apply topically to affected area 2 times a week (2021 11:53)  estradiol 0.1 mg/24 hours twice weekly transdermal film, extended release: 1 patch transdermal 2 times a week (2021 11:53)  irbesartan 75 mg oral tablet: orally once a day (at bedtime) (2021 11:53)  metFORMIN 750 mg oral tablet, extended release: 1 tab(s) orally once a day (2021 11:53)  Motrin 600 mg oral tablet: 1 tab(s) orally every 6 hours (2021 14:26)  Multiple Vitamins oral tablet, chewable: 1 tab(s) orally once a day (2021 11:53)  potassium chloride 10 mEq oral capsule, extended release: 2 tab(s) orally once a day (2021 11:53)  Probichew oral tablet, chewable: 1 tab(s) orally once a day (2021 11:53)  Tylenol 325 mg oral tablet: 3 tab(s) orally every 6 hours (2021 14:26)  valACYclovir 500 mg oral tablet: orally once a day (at bedtime) (2021 11:53)  Vascepa 1 g oral capsule: 2 cap(s) orally 2 times a day (2021 11:53)    MEDICATIONS  (STANDING):    MEDICATIONS  (PRN):    ALLERGIES/INTOLERANCES:  Allergies  No Known Allergies    Intolerances  opioid-like analgesics (Nausea)    VITALS & EXAMINATION:  Vital Signs Last 24 Hrs  T(C): 36.5 (2021 23:52), Max: 36.5 (2021 23:52)  T(F): 97.7 (2021 23:52), Max: 97.7 (2021 23:52)  HR: 74 (2021 23:52) (74 - 74)  BP: 192/112 (2021 23:52) (192/112 - 192/112)  RR: 20 (2021 23:52) (20 - 20)  SpO2: 98% (2021 23:52) (98% - 98%)    General: Female, appears stated age, in no apparent distress including pain    Neurological (>12):  MS: Awake, alert, oriented to person, place, situation, time. Normal affect. Follows all commands.    Language: Speech is clear, fluent with good comprehension     CNs: PERRLA (R = 3mm, L = 3mm). CVF full. EOMI no nystagmus. V1-3 intact to LT b/l. No facial asymmetry b/l, Hearing grossly normal (rubbing fingers) b/l. Symmetric palate elevation in midline. Gag reflex deferred. Head turning & shoulder shrug intact b/l. Tongue midline, normal movements, no atrophy.    Fundoscopic: deferred    Motor: Normal muscle bulk & tone.   No motor drift in the extremities.      Sensation: Intact to LT b/l throughout.     Cortical: Extinction on DSS (neglect): none    Reflexes:              Biceps(C5)       BR(C6)     Triceps(C7)               Patellar(L4)    Achilles(S1)    Plantar Resp  R	2	          2	             2		        2		    2		Down   L	2	          2	             2		        2		    2		Down     Coordination: No dysmetria to FTN/HTS    Gait: deferred    LABORATORY:  CBC   Chem       LFTs   Coagulopathy   Lipid Panel   A1c   Cardiac enzymes     U/A   CSF  Immunological  Other    STUDIES & IMAGING:  Studies (EKG, EEG, EMG, etc):     Radiology (XR, CT, MR, U/S, TTE/BURTON): HPI: 65 year old right-handed F with a PMH of HTN, borderline DM, vulvar cancer s/p vulvectomy, and HLD who presented on 4/10 with lightheadedness, nausea, and vomiting. LKN 20:00 on . She found that her blood pressure was elevated at home. Patient denies weakness, numbness, vision changes, dysarthria, vertigo, or headache. Patient ambulates independently. She has never had a stroke. She has a strong family history of cardiac disease with her brother dying in his 50's and another brother s/p a bypass.       NIHSS: 0  MRS: 0    REVIEW OF SYSTEMS  A 10-system ROS was performed and is negative except for those items noted above and/or in the HPI.    PAST MEDICAL & SURGICAL HISTORY:  H/O hyperlipidemia    HTN - Hypertension    Hypertriglyceridemia    Anxiety    Diabetes mellitus    Vulvar cancer    Degenerative lumbar disc    S/P  Section  x 2  After 2nd delivery was post partum pre-eclamptic    S/P D&amp;C  x 2  for heavy bleeding    S/P tonsillectomy    S/P cone biopsy of cervix  benign      FAMILY HISTORY:  strong cardiac history as detailed in hpi    SOCIAL HISTORY:   T/E/D: denies tobacco/alcohol use  Occupation: insurance benefit manager    MEDICATIONS (HOME):  Home Medications:  atenolol 50 mg oral tablet: orally 2 times a day (2021 11:53)  atorvastatin 40 mg oral tablet: 1 tab(s) orally once a day at bedtime (2021 11:53)  bacitracin 500 units/g topical ointment: Apply topically to affected area 3 to 4 times a day (2021 14:26)  clobetasol 0.05% topical ointment: Apply topically to affected area 2 times a week (2021 11:53)  estradiol 0.1 mg/24 hours twice weekly transdermal film, extended release: 1 patch transdermal 2 times a week (2021 11:53)  irbesartan 75 mg oral tablet: orally once a day (at bedtime) (2021 11:53)  metFORMIN 750 mg oral tablet, extended release: 1 tab(s) orally once a day (2021 11:53)  Motrin 600 mg oral tablet: 1 tab(s) orally every 6 hours (2021 14:26)  Multiple Vitamins oral tablet, chewable: 1 tab(s) orally once a day (2021 11:53)  potassium chloride 10 mEq oral capsule, extended release: 2 tab(s) orally once a day (2021 11:53)  Probichew oral tablet, chewable: 1 tab(s) orally once a day (2021 11:53)  Tylenol 325 mg oral tablet: 3 tab(s) orally every 6 hours (2021 14:26)  valACYclovir 500 mg oral tablet: orally once a day (at bedtime) (2021 11:53)  Vascepa 1 g oral capsule: 2 cap(s) orally 2 times a day (2021 11:53)    MEDICATIONS  (STANDING):    MEDICATIONS  (PRN):    ALLERGIES/INTOLERANCES:  Allergies  No Known Allergies    Intolerances  opioid-like analgesics (Nausea)    VITALS & EXAMINATION:  Vital Signs Last 24 Hrs  T(C): 36.5 (2021 23:52), Max: 36.5 (2021 23:52)  T(F): 97.7 (2021 23:52), Max: 97.7 (2021 23:52)  HR: 74 (2021 23:52) (74 - 74)  BP: 192/112 (2021 23:52) (192/112 - 192/112)  RR: 20 (2021 23:52) (20 - 20)  SpO2: 98% (2021 23:52) (98% - 98%)    General: Female, appears stated age, in no apparent distress including pain  Cardiovascular: RRR, no murmurs    Neurological (>12):  MS: Awake, alert, oriented to person, place, situation, time. Normal affect. Follows all commands.    Language: Speech is clear, fluent with good comprehension/repetition     CNs: PERRL (R = 3mm, L = 3mm). CVF full. EOMI no nystagmus. V1-3 intact to LT b/l. No facial asymmetry b/l, Hearing grossly normal (rubbing fingers) b/l. Symmetric palate elevation in midline. Gag reflex deferred. Head turning & shoulder shrug intact b/l. Tongue midline, normal movements, no atrophy.    Fundoscopic: deferred    Motor: Normal muscle bulk & tone.   No motor drift in the extremities.      Sensation: Intact to LT b/l throughout.     Cortical: Extinction on DSS (neglect): none    Reflexes:              Biceps(C5)       BR(C6)     Triceps(C7)               Patellar(L4)    Achilles(S1)    Plantar Resp  R	3	          3	             3		        3		    2		Down   L	3	          3	             3		        3		    2		Down     Coordination: No dysmetria to FTN/HTS b/l    Gait: deferred    LABORATORY:  CBC   Chem       LFTs   Coagulopathy   Lipid Panel   A1c   Cardiac enzymes     U/A   CSF  Immunological  Other    STUDIES & IMAGING:  Studies (EKG, EEG, EMG, etc):     Radiology (XR, CT, MR, U/S, TTE/BURTON):  < from: CT Brain Stroke Protocol (04.10.21 @ 00:18) >  IMPRESSION:    No acute intracranial hemorrhage, mass effect, or evidence of acute vascular territorial infarction. If clinical symptoms persist or worsen, more sensitive evaluation with brain MRI may be obtained, if no contraindications exist.    < end of copied text >

## 2021-04-10 NOTE — ED PROVIDER NOTE - PATIENT PORTAL LINK FT
You can access the FollowMyHealth Patient Portal offered by [facility] by registering at the following website: [email or web address] By joining [email or web address]’s FollowMyHealth portal, you will also be able to view your health information using other applications (apps) compatible with our system.

## 2021-04-10 NOTE — ED ADULT NURSE NOTE - OBJECTIVE STATEMENT
Pt is a AAOx3, 65y female c/o light-headedness, diaphoresis, nausea and chills at 2000hrs. Pt states that she began to experience diarrhea as well and HTN with systolic pressure 190's and came to ED for evaluation. Pt states she had a vulvectomy on Wednesday with no complications. Code stroke initiated in triage. Neuro intact. Extremities equal and strong b/l. NSR on cardiac monitor. Denies cp, sob, palpitations, vomiting, vision changes or weakness. Denies fevers or sick contacts.

## 2021-04-10 NOTE — ED PROVIDER NOTE - PHYSICAL EXAMINATION
General: alert, conversant, looks well, vitals reassuring, currently without sx   Head: atraumatic, normocephalic  Eyes: PERRL, EOMI, no scleral icterus  ENT: no epistaxis, moist mucous membranes, normal phonation, airway patent  Neck: full ROM, no midline ttp  CV: RRR, no murmurs, HDS  Pulm: lungs CTA b/l, no wheezing, no respiratory distress  GI: abd soft, non tender, no guarding/rebound/masses  Back: normal ROM, no midline ttp, no signs of trauma  Extremities: normal ROM, joints stable, distal pulses intact, no edema  Neuro: alert, oriented x3, moving all extremities, interactive, normal speech/memory, 5/5 strength in extremities, no nystagmus   Derm: warm, dry, normal color, no rash/wounds

## 2021-04-12 ENCOUNTER — NON-APPOINTMENT (OUTPATIENT)
Age: 66
End: 2021-04-12

## 2021-04-22 ENCOUNTER — APPOINTMENT (OUTPATIENT)
Dept: GYNECOLOGIC ONCOLOGY | Facility: CLINIC | Age: 66
End: 2021-04-22
Payer: COMMERCIAL

## 2021-04-22 VITALS — DIASTOLIC BLOOD PRESSURE: 86 MMHG | SYSTOLIC BLOOD PRESSURE: 130 MMHG | HEART RATE: 76 BPM

## 2021-04-22 DIAGNOSIS — D04.60 CARCINOMA IN SITU OF SKIN OF UNSPECIFIED UPPER LIMB, INCLUDING SHOULDER: ICD-10-CM

## 2021-04-22 PROCEDURE — 99024 POSTOP FOLLOW-UP VISIT: CPT

## 2021-04-29 LAB — SURGICAL PATHOLOGY STUDY: SIGNIFICANT CHANGE UP

## 2021-05-03 ENCOUNTER — NON-APPOINTMENT (OUTPATIENT)
Age: 66
End: 2021-05-03

## 2021-05-19 ENCOUNTER — APPOINTMENT (OUTPATIENT)
Dept: GASTROENTEROLOGY | Facility: CLINIC | Age: 66
End: 2021-05-19
Payer: COMMERCIAL

## 2021-05-19 VITALS
HEART RATE: 69 BPM | WEIGHT: 142 LBS | TEMPERATURE: 97.1 F | DIASTOLIC BLOOD PRESSURE: 84 MMHG | SYSTOLIC BLOOD PRESSURE: 140 MMHG | BODY MASS INDEX: 25.16 KG/M2 | HEIGHT: 63 IN

## 2021-05-19 DIAGNOSIS — Z12.11 ENCOUNTER FOR SCREENING FOR MALIGNANT NEOPLASM OF COLON: ICD-10-CM

## 2021-05-19 PROCEDURE — 99072 ADDL SUPL MATRL&STAF TM PHE: CPT

## 2021-05-19 PROCEDURE — 99204 OFFICE O/P NEW MOD 45 MIN: CPT

## 2021-05-19 RX ORDER — IRBESARTAN 150 MG/1
150 TABLET, FILM COATED ORAL
Refills: 0 | Status: ACTIVE | COMMUNITY

## 2021-05-19 RX ORDER — ESTRADIOL 0.1 MG/G
CREAM VAGINAL
Refills: 0 | Status: DISCONTINUED | COMMUNITY
End: 2021-05-19

## 2021-05-19 NOTE — ASSESSMENT
[FreeTextEntry1] : 1.  Encounter for surveillance colonoscopy.  History of colon polyps (2002, 2006).  Prior colonoscopy in 2010 and 2016 with hemorrhoids.\par 2.  IBS- diarrhea.\par 3.  HTN.\par 4.  HLD.\par 5.  Prediabetes.\par 6.  Status post C-sections, vulvectomy.\par \par Recs:\par - Recent ER labs from April 2021 reviewed (after Gyn procedure with delayed reaction).\par - Prior colonoscopy records reviewed.\par - Patient was advised to speak with endocrinologist about using Ozempic for weight loss.\par - Patient was advised to undergo colonoscopy- procedure, risks, benefits, and alternatives were explained. Patient agreeable. Brochure given. Suprep.

## 2021-05-19 NOTE — HISTORY OF PRESENT ILLNESS
[Heartburn] : denies heartburn [Nausea] : denies nausea [Vomiting] : denies vomiting [Constipation] : denies constipation [Yellow Skin Or Eyes (Jaundice)] : denies jaundice [Abdominal Pain] : denies abdominal pain [_________] : Performed [unfilled] [Abdominal Swelling] : abdominal swelling stable [de-identified] : Kellee presents to the office today to schedule a surveillance colonoscopy.  She was last seen in this office in 2016 when she underwent a colonoscopy with Dr. Cooper.\par \par The patient had colon polyps removed in 2002 and 2006 with Dr. Obed Kwan.  On her colonoscopy here in 2010 and 2016 only hemorrhoids were seen.\par \par The patient has generally been feeling stable from a GI perspective.  She continues to have abdominal distention and bloating after eating meals.  She has been eating less and walking more during the pandemic and has lost some weight but her weight has plateaued and she is having difficulty decreasing her abdominal girth.  She normally moves her bowels once a day but she can experience diarrhea when she is nervous.  She does see trace blood from hemorrhoids.  She denies any nausea, abdominal pain, or family history of GI malignancies. [de-identified] : hemorrhoids

## 2021-06-03 ENCOUNTER — TRANSCRIPTION ENCOUNTER (OUTPATIENT)
Age: 66
End: 2021-06-03

## 2021-06-04 ENCOUNTER — TRANSCRIPTION ENCOUNTER (OUTPATIENT)
Age: 66
End: 2021-06-04

## 2021-06-15 ENCOUNTER — APPOINTMENT (OUTPATIENT)
Dept: GASTROENTEROLOGY | Facility: CLINIC | Age: 66
End: 2021-06-15
Payer: COMMERCIAL

## 2021-06-15 ENCOUNTER — LABORATORY RESULT (OUTPATIENT)
Age: 66
End: 2021-06-15

## 2021-06-15 DIAGNOSIS — K76.0 FATTY (CHANGE OF) LIVER, NOT ELSEWHERE CLASSIFIED: ICD-10-CM

## 2021-06-15 DIAGNOSIS — R14.0 ABDOMINAL DISTENSION (GASEOUS): ICD-10-CM

## 2021-06-15 PROCEDURE — 99072 ADDL SUPL MATRL&STAF TM PHE: CPT

## 2021-06-15 PROCEDURE — 45380 COLONOSCOPY AND BIOPSY: CPT | Mod: 33

## 2021-06-30 ENCOUNTER — APPOINTMENT (OUTPATIENT)
Dept: ULTRASOUND IMAGING | Facility: CLINIC | Age: 66
End: 2021-06-30
Payer: COMMERCIAL

## 2021-06-30 ENCOUNTER — RESULT REVIEW (OUTPATIENT)
Age: 66
End: 2021-06-30

## 2021-06-30 ENCOUNTER — NON-APPOINTMENT (OUTPATIENT)
Age: 66
End: 2021-06-30

## 2021-06-30 PROCEDURE — 76830 TRANSVAGINAL US NON-OB: CPT

## 2021-06-30 PROCEDURE — 76700 US EXAM ABDOM COMPLETE: CPT

## 2021-06-30 PROCEDURE — 76856 US EXAM PELVIC COMPLETE: CPT

## 2021-07-07 PROBLEM — K76.0 FATTY LIVER: Status: ACTIVE | Noted: 2021-07-07

## 2021-07-07 RX ORDER — SODIUM SULFATE, POTASSIUM SULFATE, MAGNESIUM SULFATE 17.5; 3.13; 1.6 G/ML; G/ML; G/ML
17.5-3.13-1.6 SOLUTION, CONCENTRATE ORAL
Qty: 1 | Refills: 0 | Status: DISCONTINUED | COMMUNITY
Start: 2021-05-19 | End: 2021-07-07

## 2021-07-21 ENCOUNTER — NON-APPOINTMENT (OUTPATIENT)
Age: 66
End: 2021-07-21

## 2021-07-22 ENCOUNTER — NON-APPOINTMENT (OUTPATIENT)
Age: 66
End: 2021-07-22

## 2021-08-19 ENCOUNTER — APPOINTMENT (OUTPATIENT)
Dept: GYNECOLOGIC ONCOLOGY | Facility: CLINIC | Age: 66
End: 2021-08-19
Payer: COMMERCIAL

## 2021-08-19 VITALS
BODY MASS INDEX: 24.8 KG/M2 | HEIGHT: 63 IN | WEIGHT: 140 LBS | SYSTOLIC BLOOD PRESSURE: 147 MMHG | DIASTOLIC BLOOD PRESSURE: 81 MMHG | HEART RATE: 62 BPM

## 2021-08-19 PROCEDURE — 56820 COLPOSCOPY VULVA: CPT

## 2021-08-19 PROCEDURE — 99213 OFFICE O/P EST LOW 20 MIN: CPT | Mod: 25

## 2021-08-19 NOTE — LETTER BODY
[Dear  ___] : Dear  [unfilled], [I recently saw our patient [unfilled] for a follow-up visit.] : I recently saw our patient, [unfilled] for a follow-up visit. [Attached please find my note.] : Attached please find my note. [FreeTextEntry2] : Her vulvar colposcopic exam is without evidence of recurrent dysplasia. She will see you for her other issues and will follow up with me in 6 months. Thank you again for referring this seth patient.

## 2021-08-19 NOTE — HISTORY OF PRESENT ILLNESS
[FreeTextEntry1] : Ms. 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 She presented to Dr. White with a right labia "sore." She's been seeing Dr. White for over 6 years due to vulvodynia and lichen sclerosus.\par \par 2021\par Right labium minus biopsy\par -squamous cell carcinoma in situ\par \par 2021- Partial Simple Vulvectomy, right periclitoral area- VIELKA 3\par \par She underwent a workup for abdominal bloating and this included a pelvic sono by Dr. Rice; thickened endometrium of 7-8mm was reported 21 and  normal ovaries with no FF. \par She saw Dr. White and he did an in-office sono and an EMB and per patient these were negative. \par She has had no PMB, before nor after the biopsy. \par \par She denies vulvar pruritus, discharge or pain, or any other associated signs or symptoms.\par She is having vulvar dryness and this is managed by Dr. White, she is using clobetasol 3x/week for lichen sclerosus.\par \par HM:\par PAP 2018- Negative for intraepithelial lesion or malignancy, (-) HRHPV; screening per Dr. White\par Mammo : 10/2020 normal ;always has ultrasound which was normal\par Colonoscopy : 2018 which was normal \par \par

## 2021-08-19 NOTE — PROCEDURE
[Colposcopy] : colposcopy [Patient] : the patient [Verbal Consent] : verbal consent was obtained prior to the procedure and is detailed in the patient's record [Site Verification] : site verification performed. [Time Out] : Time Out Procedure:The following was confirmed prior to the procedure-Correct patient identity, correct site, agreement on the procedure to be done and correct patient position. [No Abnormalities] : no abnormalities seen [No Complications] : none [Tolerated Well] : the patient tolerated the procedure well [Post procedure instructions and information given] : post procedure instructions and information given and reviewed with patient. [0] : 0 [FreeTextEntry9] : vulvar dysplasia

## 2021-08-19 NOTE — PHYSICAL EXAM
[Normal] : Recent and remote memory: Intact [de-identified] : see colpo note [de-identified] : no lesions

## 2022-03-10 ENCOUNTER — APPOINTMENT (OUTPATIENT)
Dept: GYNECOLOGIC ONCOLOGY | Facility: CLINIC | Age: 67
End: 2022-03-10
Payer: COMMERCIAL

## 2022-03-10 VITALS
WEIGHT: 120 LBS | HEART RATE: 78 BPM | SYSTOLIC BLOOD PRESSURE: 122 MMHG | DIASTOLIC BLOOD PRESSURE: 81 MMHG | HEIGHT: 63 IN | BODY MASS INDEX: 21.26 KG/M2

## 2022-03-10 PROCEDURE — 99213 OFFICE O/P EST LOW 20 MIN: CPT | Mod: 25

## 2022-03-10 PROCEDURE — 56820 COLPOSCOPY VULVA: CPT

## 2022-03-10 RX ORDER — SEMAGLUTIDE 0.68 MG/ML
INJECTION, SOLUTION SUBCUTANEOUS
Refills: 0 | Status: ACTIVE | COMMUNITY

## 2022-03-10 RX ORDER — METFORMIN HYDROCHLORIDE 500 MG/1
500 TABLET, COATED ORAL
Refills: 0 | Status: ACTIVE | COMMUNITY

## 2022-03-11 LAB — HPV HIGH+LOW RISK DNA PNL CVX: NOT DETECTED

## 2022-03-16 LAB — CYTOLOGY CVX/VAG DOC THIN PREP: ABNORMAL

## 2022-04-08 ENCOUNTER — APPOINTMENT (OUTPATIENT)
Dept: ULTRASOUND IMAGING | Facility: CLINIC | Age: 67
End: 2022-04-08
Payer: COMMERCIAL

## 2022-04-08 PROCEDURE — 76856 US EXAM PELVIC COMPLETE: CPT

## 2022-04-08 PROCEDURE — 76830 TRANSVAGINAL US NON-OB: CPT

## 2022-04-12 ENCOUNTER — NON-APPOINTMENT (OUTPATIENT)
Age: 67
End: 2022-04-12

## 2022-05-17 NOTE — STROKE CODE NOTE - INITIAL PRESENTATION
Assumed care of patient at 0700 from Charity RN. Patient is A&O x4, states pain level is 0/10. Bed locked in lowest position with 2 rail up. Call light  in place, belongings at bedside. Hourly rounding is in place.    ED

## 2022-09-22 ENCOUNTER — APPOINTMENT (OUTPATIENT)
Dept: GYNECOLOGIC ONCOLOGY | Facility: CLINIC | Age: 67
End: 2022-09-22

## 2022-09-22 ENCOUNTER — NON-APPOINTMENT (OUTPATIENT)
Age: 67
End: 2022-09-22

## 2022-09-22 VITALS
DIASTOLIC BLOOD PRESSURE: 75 MMHG | BODY MASS INDEX: 20.38 KG/M2 | HEIGHT: 63 IN | WEIGHT: 115 LBS | HEART RATE: 75 BPM | SYSTOLIC BLOOD PRESSURE: 117 MMHG

## 2022-09-22 PROCEDURE — 99213 OFFICE O/P EST LOW 20 MIN: CPT

## 2022-09-22 RX ORDER — MULTIVITAMIN
TABLET ORAL
Refills: 0 | Status: DISCONTINUED | COMMUNITY
End: 2022-09-22

## 2022-09-27 ENCOUNTER — RX RENEWAL (OUTPATIENT)
Age: 67
End: 2022-09-27

## 2022-10-28 ENCOUNTER — RESULT REVIEW (OUTPATIENT)
Age: 67
End: 2022-10-28

## 2022-10-28 ENCOUNTER — APPOINTMENT (OUTPATIENT)
Dept: MAMMOGRAPHY | Facility: CLINIC | Age: 67
End: 2022-10-28

## 2022-10-28 ENCOUNTER — APPOINTMENT (OUTPATIENT)
Dept: ULTRASOUND IMAGING | Facility: CLINIC | Age: 67
End: 2022-10-28

## 2022-10-28 PROCEDURE — 77067 SCR MAMMO BI INCL CAD: CPT

## 2022-10-28 PROCEDURE — 76641 ULTRASOUND BREAST COMPLETE: CPT | Mod: 50

## 2022-10-28 PROCEDURE — 77063 BREAST TOMOSYNTHESIS BI: CPT

## 2023-02-09 NOTE — ED PROVIDER NOTE - CLINICAL SUMMARY MEDICAL DECISION MAKING FREE TEXT BOX
February 9, 2023      [facility] [place]  [address]  [place] [address]  Phone: [phone]  Fax: [phone]       Patient: Kalyn Kirby   YOB: 2001  Date of Visit: 02/09/2023    To Whom It May Concern:    Damaris Kirby  was at [facility] on 02/09/2023. The patient may return to work/school on 2/10/2023 with no restrictions, if afebrile without the use of medications. If you have any questions or concerns, or if I can be of further assistance, please do not hesitate to contact me.    Sincerely,        Tiffany Urban NP      Alma Delia, PGY3- seen after code stroke activation for dizziness. Neuro did not feel any focal deficit was appreciated and clinically not c/w central pathology. Imaging unremarkable.  Upon ED team eval in room pt without sx. Feeling much improved.   DDx: food poisoning, mild gastris, vasovagal. Not suspicious for acute abd pathology given no pain, and no ttp. Looks well. Will hydrated and treat sx. Anticipate d/c with supportive care. Story not consistent with CNS pathology, ACS, major vascular issue, or serious bacterial illness.

## 2023-03-23 ENCOUNTER — APPOINTMENT (OUTPATIENT)
Dept: GYNECOLOGIC ONCOLOGY | Facility: CLINIC | Age: 68
End: 2023-03-23
Payer: COMMERCIAL

## 2023-03-23 VITALS
HEART RATE: 72 BPM | SYSTOLIC BLOOD PRESSURE: 121 MMHG | HEIGHT: 63 IN | BODY MASS INDEX: 20.02 KG/M2 | WEIGHT: 113 LBS | DIASTOLIC BLOOD PRESSURE: 73 MMHG

## 2023-03-23 PROCEDURE — 56820 COLPOSCOPY VULVA: CPT

## 2023-03-23 PROCEDURE — 99213 OFFICE O/P EST LOW 20 MIN: CPT | Mod: 25

## 2023-03-23 RX ORDER — MELOXICAM 15 MG/1
TABLET ORAL
Refills: 0 | Status: ACTIVE | COMMUNITY

## 2023-04-03 LAB
CYTOLOGY CVX/VAG DOC THIN PREP: ABNORMAL
HPV HIGH+LOW RISK DNA PNL CVX: NOT DETECTED

## 2023-04-05 ENCOUNTER — NON-APPOINTMENT (OUTPATIENT)
Age: 68
End: 2023-04-05

## 2023-04-13 ENCOUNTER — APPOINTMENT (OUTPATIENT)
Dept: ULTRASOUND IMAGING | Facility: CLINIC | Age: 68
End: 2023-04-13
Payer: COMMERCIAL

## 2023-04-13 PROCEDURE — 76830 TRANSVAGINAL US NON-OB: CPT

## 2023-04-13 PROCEDURE — 76856 US EXAM PELVIC COMPLETE: CPT

## 2023-04-19 NOTE — LETTER BODY
[Dear  ___] : Dear  [unfilled], [I recently saw our patient [unfilled] for a follow-up visit.] : I recently saw our patient, [unfilled] for a follow-up visit. [Attached please find my note.] : Attached please find my note. [FreeTextEntry2] : Her vulvar colposcopic exam is without evidence of recurrent dysplasia. She will see you for her other issues and will follow up with me in 6 months. Thank you again for referring this seth patient. [FreeTextEntry1] : pap/hpv, sono

## 2023-04-19 NOTE — PHYSICAL EXAM
[Normal] : Anus and perineum: Normal sphincter tone, no masses, no prolapse. [Chaperone Present] : A chaperone was present in the examining room during all aspects of the physical examination [de-identified] : see colpo note [de-identified] : no lesions

## 2023-04-19 NOTE — HISTORY OF PRESENT ILLNESS
[FreeTextEntry1] : Ms. Guevara is a , LMP at about 57 years old referred by Dr. White for vulvar squamous cell carcinoma in situ. She presented to Dr. White with a right labia "sore." She's been seeing Dr. White for over 6 years due to vulvodynia and lichen sclerosus.\par \par 2021\par Right labium minus biopsy\par -squamous cell carcinoma in situ\par \par 2021- Partial Simple Vulvectomy, right periclitoral area- VIELKA 3\par \par She underwent a workup for abdominal bloating and this included a pelvic sono by Dr. Rice; thickened endometrium of 7-8mm was reported 21 and  normal ovaries with no FF. \par She saw Dr. White and he did an in-office sono and an EMB and per patient these were negative. \par She has had no PMB, before nor after the biopsy. \par 22 sono: normal sono; EM 5mm - decreased from prior\par \par She denies vulvar pruritus, discharge or pain, or any other associated signs or symptoms.\par She continues to have vulvar dryness and trauma with intercourse, and this is managed by Dr. White, she is using clobetasol 1-3x/week for lichen sclerosus.\par \par HM:\par PAP: 3/2022 negative / HPV (-) ; h/o cone biopsy\par Mammo : 10/2022 normal ;always has ultrasound which was normal per pt\par Colonoscopy :  which was normal

## 2023-06-26 ENCOUNTER — RX RENEWAL (OUTPATIENT)
Age: 68
End: 2023-06-26

## 2023-08-23 NOTE — H&P PST ADULT - NSALCOHOLPROBLEMSRELYN_GEN_A_CORE_SD
75 y/o F with PMH of non-insulin DM, HTN, and HLD presents c/o 2 episodes of NBNB N/V today. Patient notes that for several months she was having issues with having a "bad feeling" in her abdomen that was a discomfort described more as nausea rather than overt pain. The discomfort was originally sporadic and would only last for a few minutes at a time with complete resolution after each incident so she did not pursue any medical evaluations or interventions for it prior to this visit. However, today the discomfort started at around 11am and has been constant since that time with associated 2 episodes of NBNB vomiting, lightheaded dizziness, shakiness, and pre-syncope prompting current visit. At time of exam patient endorses some improvement of of symptoms but slight residual discomfort. Of note, she did have her DM medications adjusted 3 weeks earlier with new medications added to her previous regimen but her sporadic symptoms were still going on before that point. Denies any other complaints or concerns. Denies chest pain, SOB, cough, fevers, chills, diarrhea, constipation, urinary complaints, HA, numbness, tingling, weakness, sick contacts, recent travel. nausea and vomiting     1 Chest pain  - telemonitor  - cards fu   - ro ACS  - Ischemia casey as per cards    2 Nausea and vomitng, abdominal pain  - cw protonix  - CT neg  - resolved   - will monitor    3 DM  - monitor FS  - ISS  - endo consult    dc plannning      no

## 2023-09-21 ENCOUNTER — APPOINTMENT (OUTPATIENT)
Dept: GYNECOLOGIC ONCOLOGY | Facility: CLINIC | Age: 68
End: 2023-09-21
Payer: COMMERCIAL

## 2023-09-21 VITALS
SYSTOLIC BLOOD PRESSURE: 132 MMHG | HEIGHT: 63 IN | DIASTOLIC BLOOD PRESSURE: 85 MMHG | WEIGHT: 113 LBS | HEART RATE: 70 BPM | BODY MASS INDEX: 20.02 KG/M2

## 2023-09-21 DIAGNOSIS — R35.0 FREQUENCY OF MICTURITION: ICD-10-CM

## 2023-09-21 PROCEDURE — 56821 COLPOSCOPY VULVA W/BIOPSY: CPT | Mod: 59

## 2023-09-21 PROCEDURE — 99213 OFFICE O/P EST LOW 20 MIN: CPT | Mod: 25

## 2023-09-22 ENCOUNTER — TRANSCRIPTION ENCOUNTER (OUTPATIENT)
Age: 68
End: 2023-09-22

## 2023-10-23 DIAGNOSIS — Z12.39 ENCOUNTER FOR OTHER SCREENING FOR MALIGNANT NEOPLASM OF BREAST: ICD-10-CM

## 2023-10-27 DIAGNOSIS — Z13.820 ENCOUNTER FOR SCREENING FOR OSTEOPOROSIS: ICD-10-CM

## 2023-10-30 ENCOUNTER — APPOINTMENT (OUTPATIENT)
Dept: ULTRASOUND IMAGING | Facility: CLINIC | Age: 68
End: 2023-10-30
Payer: COMMERCIAL

## 2023-10-30 ENCOUNTER — APPOINTMENT (OUTPATIENT)
Dept: RADIOLOGY | Facility: CLINIC | Age: 68
End: 2023-10-30
Payer: COMMERCIAL

## 2023-10-30 ENCOUNTER — RESULT REVIEW (OUTPATIENT)
Age: 68
End: 2023-10-30

## 2023-10-30 ENCOUNTER — APPOINTMENT (OUTPATIENT)
Dept: OBGYN | Facility: CLINIC | Age: 68
End: 2023-10-30
Payer: COMMERCIAL

## 2023-10-30 ENCOUNTER — APPOINTMENT (OUTPATIENT)
Dept: MAMMOGRAPHY | Facility: CLINIC | Age: 68
End: 2023-10-30
Payer: COMMERCIAL

## 2023-10-30 ENCOUNTER — NON-APPOINTMENT (OUTPATIENT)
Age: 68
End: 2023-10-30

## 2023-10-30 LAB
APPEARANCE: CLEAR
BACTERIA UR CULT: NORMAL
BACTERIA: NEGATIVE /HPF
BILIRUBIN URINE: NEGATIVE
BLOOD URINE: NEGATIVE
CAST: 0 /LPF
COLOR: YELLOW
EPITHELIAL CELLS: 0 /HPF
GLUCOSE QUALITATIVE U: NEGATIVE MG/DL
KETONES URINE: NEGATIVE MG/DL
LEUKOCYTE ESTERASE URINE: NEGATIVE
MICROSCOPIC-UA: NORMAL
NITRITE URINE: NEGATIVE
PH URINE: 8
PROTEIN URINE: NEGATIVE MG/DL
RED BLOOD CELLS URINE: 0 /HPF
SPECIFIC GRAVITY URINE: 1.01
UROBILINOGEN URINE: 0.2 MG/DL
WHITE BLOOD CELLS URINE: 0 /HPF

## 2023-10-30 PROCEDURE — 77085 DXA BONE DENSITY AXL VRT FX: CPT

## 2023-10-30 PROCEDURE — 56820 COLPOSCOPY VULVA: CPT

## 2023-10-30 PROCEDURE — 99213 OFFICE O/P EST LOW 20 MIN: CPT | Mod: 25

## 2023-10-30 PROCEDURE — 76641 ULTRASOUND BREAST COMPLETE: CPT | Mod: 50

## 2023-10-30 PROCEDURE — 77067 SCR MAMMO BI INCL CAD: CPT

## 2023-10-30 PROCEDURE — 77063 BREAST TOMOSYNTHESIS BI: CPT

## 2023-10-30 RX ORDER — ESTRADIOL 0.1 MG/G
0.1 CREAM VAGINAL
Qty: 1 | Refills: 2 | Status: ACTIVE | COMMUNITY
Start: 2023-10-30 | End: 1900-01-01

## 2023-11-03 ENCOUNTER — NON-APPOINTMENT (OUTPATIENT)
Age: 68
End: 2023-11-03

## 2023-11-28 ENCOUNTER — NON-APPOINTMENT (OUTPATIENT)
Age: 68
End: 2023-11-28

## 2023-12-26 ENCOUNTER — RX RENEWAL (OUTPATIENT)
Age: 68
End: 2023-12-26

## 2023-12-26 RX ORDER — VALACYCLOVIR 500 MG/1
500 TABLET, FILM COATED ORAL
Qty: 90 | Refills: 3 | Status: ACTIVE | COMMUNITY
Start: 2022-09-27 | End: 1900-01-01

## 2024-01-10 ENCOUNTER — APPOINTMENT (OUTPATIENT)
Dept: OBGYN | Facility: CLINIC | Age: 69
End: 2024-01-10
Payer: COMMERCIAL

## 2024-01-10 VITALS — DIASTOLIC BLOOD PRESSURE: 70 MMHG | BODY MASS INDEX: 20.19 KG/M2 | SYSTOLIC BLOOD PRESSURE: 110 MMHG | WEIGHT: 114 LBS

## 2024-01-10 DIAGNOSIS — N94.810 VULVAR VESTIBULITIS: ICD-10-CM

## 2024-01-10 DIAGNOSIS — N90.4 LEUKOPLAKIA OF VULVA: ICD-10-CM

## 2024-01-10 DIAGNOSIS — K59.02 OUTLET DYSFUNCTION CONSTIPATION: ICD-10-CM

## 2024-01-10 PROCEDURE — 99213 OFFICE O/P EST LOW 20 MIN: CPT | Mod: 25

## 2024-01-10 PROCEDURE — 56820 COLPOSCOPY VULVA: CPT

## 2024-01-10 PROCEDURE — 87210 SMEAR WET MOUNT SALINE/INK: CPT | Mod: QW

## 2024-01-10 NOTE — HISTORY OF PRESENT ILLNESS
[FreeTextEntry1] : The patient has lichen sclerosus and vulvodynia. In February 2021, a right labium minus biopsy showed VIELKA 3. In April 2021, she had a partial simple vulvectomy of the right periclitoral area. She recently saw Dr. Givens who noted no recurrence of her VIELKA 3. She is using clobetasol 2 times weekly. The patient also has Vestibulodynia, narrowing of the introitus (due to both lichen sclerosus related fusion in the anterior vestibule and sexual inactivity in menopause) and reactive introital spasm.  She tried using vulvar estradiol cream for 2 weeks and found that it caused her to be itchy with increased discharge so she discontinued it.  She has not been using anything since then.  2 days ago, she noted marked erythema and itching of her vulvar skin.  She associates it with the change in the soap that she has been using.

## 2024-01-10 NOTE — ASSESSMENT
[TextEntry] : The patient's lichen sclerosus is well controlled with twice weekly clobetasol. She is being followed by Dr. Jessica Givens for a history of VIELKA 3 that was resected in 2021. The patient also has Vestibulodynia, narrowing of the introitus (due to both lichen sclerosus related fusion in the anterior vestibule and sexual inactivity in menopause) and reactive introital spasm. The introitus does not admit two examining fingers.  2 days ago, she developed intense vulvar itching.  Her vagina is markedly atrophic and the vulva is not erythematous today.  The patient relates that she had tried a new soap recently.  This may be the trigger of her itching. 23 minutes of total time was spent on the date of the encounter. This included time for review of medical records and laboratory results, face to face time (including the physical examination counseling and coordination of care), time for patient education, treatment plans, answering questions, communicating with other doctors and for medical record documentation. The time spent is separate from time spent on separately billed procedures.

## 2024-01-10 NOTE — PHYSICAL EXAM
[Chaperone Present] : A chaperone was present in the examining room during all aspects of the physical examination [TextEntry] : ***General*** General Appearance: normal; Judgment and insight: normal; the patient is oriented to time, place and person; Recent and remote memory: normal; Mood and affect: normal; Respiratory effort: normal.  ***Pelvic Examination*** External genitalia: The appearance and hair distribution are normal; no lesions are appreciated. Urethra/urethral meatus: No masses or tenderness are appreciated; there is no scarring noted. Bladder: nontender; there is no fullness appreciated; no masses were palpated. Vagina: the vagina is markedly atrophic; a scanty discharge is seen; no lesions are seen; pelvic support is adequate without any evidence of cystocele or rectocele. Cervix: normal in appearance; no overt lesions are seen. Uterus: anteverted; normal in size, mobile, nontender, and well supported. Adnexa/parametria: nontender and not enlarged; no masses are palpated. A stool specimen was not indicated at this time.  ***Vaginal Discharge Evaluation***  A saline wet mount of the vaginal discharge and KOH slide evaluation of the vaginal discharge were done.  The discharge was scanty; the pH was elevated; the whiff test was negative; clue cells were not seen; hyphae were not seen; no lactobacilli were seen; no white blood cells were seen; parabasal cells were seen; a candida culture was sent.  ***colposcopy of the vulva*** Colposcopy of the external genitalia showed that the labia majora were normal. There was complete fusion of the labia minora bilaterally and fusion in the anterior vestibule. There was fusion of the anterior vestibule. There was 6/10 vestibular tenderness of the anterior minor vestibular glands, and 6/10 vestibular tenderness of the posterior minor vestibular glands. No white epithelium was seen in the anterior vestibule in the midline. No tearing was seen. There was narrowing of the introitus on examination not allowing to examining fingers.

## 2024-01-10 NOTE — PLAN
[facility]      Name: Christine Abadie Roig  Clinic Number: [id number]    Therapy Diagnosis:   Encounter Diagnosis   Name Primary?    Weakness of back      Physician: Melly Rios MD    Visit Date: 2019    Physician Orders: PT Eval and Treat   Medical Diagnosis from Referral:    G89.4 (ICD-10-CM) - Chronic pain disorder   M47.816 (ICD-10-CM) - Lumbar spondylosis   M47.816 (ICD-10-CM) - Lumbar facet arthropathy   S76.012D (ICD-10-CM) - Strain of left psoas muscle, subsequent encounter      Evaluation Date: 2019  Authorization Period Expiration: 2019  Plan of Care Expiration: 10/9/2019  Reassessment Due: 2019  Visit # / Visits authorized:      Time In: 730  Time Out: 830  Total Billable Time: 40 minutes     Precautions: Standard     Pattern of pain determined: 1 PEP      Subjective   Agnes reports no changes in symptoms since initial visit.     Patient reports tolerating previous visit well with minimal soreness   Patient reports their pain to be 2/10 on a 0-10 scale with 0 being no pain and 10 being the worst pain imaginable.  Pain Location: L hip, low back      Occupation: [place]   Leisure: Walking, exercising, ride motor cycles   Pts goals: Find best ways to manage and alleviate pain          Objective     Baseline IM Testing Results:   Date of testin2019  ROM 0-48 deg   Max Peak Torque 104    Min Peak Torque 44    Flex/Ext Ratio 2.36:1   % below normative data 40            CMS Impairment/Limitation/Restriction for FOTO Survey     Therapist reviewed FOTO scores for Christine Abadie Roig on 2019.   FOTO documents entered into [employer] - see Media section.     Limitation Score: 29%  Category: Mobility     Current : CJ = at least 20% but < 40% impaired, limited or restricted  Goal: CI = at least 1% but < 20% impaired, limited or restricted  Discharge:              Treatment    Pt was instructed in and performed the following:      Agnes received therapeutic exercises to develop/improved posture, cardiovascular endurance, muscular endurance, lumbar/cervical ROM, strength and muscular endurance for 60 minutes including the following exercises:   [facility] 8/13/2019   Visit Number 2   VAS Pain Rating 2   Treadmill Time (in min.) 10   Lumbar Stretches - Slouch Overcorrection -   Extension in Lying -   Extension in Standing 10   Flexion in Lying -   Flexion in Sitting -   Lumbar Extension Seat Pad -   Femur Restraint -   Top Dead Center -   Counterweight -   Lumbar Flexion -   Lumbar Extension -   Lumbar Peak Torque -   Min Torque -   Test Percent Below Normative Data -   Lumbar Weight 45   Repetitions 20   Rating of Perceived Exertion 4   Ice - Z Lie (in min.) 10     LTR x10  Piriformis 10x B  HS stretch 10x   EIS 10x             Peripheral muscle strengthening which included 1 set of 15-20 repetitions at a slow, controlled 10-13 second per rep pace focused on strengthening supporting musculature for improved body mechanics and functional mobility.  Pt and therapist focused on proper form during treatment to ensure optimal strengthening of each targeted muscle group.  Machines were utilized including torso rotation, leg extension, leg curl, chest press, upright row. Tricep extension, bicep curl, leg press, and hip abduction added visit 3    Agnes received the following manual therapy techniques: NA       Home Exercises Provided and Patient Education Provided     Education provided:   - HEP and importance of adherence to program     Written Home Exercises Provided: Patient instructed to cont prior HEP.  Exercises were reviewed and Agnes was able to demonstrate them prior to the end of the session.  Agnes demonstrated good  understanding of the education provided.     See EMR under Patient Instructions for exercises provided prior visit.          Assessment     Pt able to tolerate all components of session with no  adverse effects. 45ft# is initial resistance, able to complete 20 times with RPE of 4. Will increase resistance 5-10% based on pt report of DOMS. Add extension in lying next visit.   Patient is making good progress towards established goals.  Pt will continue to benefit from skilled outpatient physical therapy to address the deficits stated in the impairment chart, provide pt/family education and to maximize pt's level of independence in the home and community environment.     Anticipated Barriers for therapy: None  Pt's spiritual, cultural and educational needs considered and pt agreeable to plan of care and goals as stated below:              GOALS: Pt is in agreement with the following goals.     Short term goals:  6 weeks or 10 visits   1.  Pt will demonstrate increased lumbar ROM by at least 5 degrees from the initial ROM value with improvements noted in functional ROM and ability to perform ADLs.  2.  Pt will demonstrate increased maximum isometric torque value by 18% when compared to the initial value resulting in improved ability to perform bending, lifting, and carrying activities safely, confidently.     3.  Patient report a reduction in worst pain score by 1-2 points for improved tolerance for completing daily activities.  4.  Pt able to perform HEP correctly with minimal cueing or supervision from therapist to encourage independent management of symptoms.         Long term goals: 13 weeks or 20 visits   1. Pt will demonstrate increased lumbar ROM by at least 8 degrees from initial ROM value, resulting in improved ability to perform functional fwd bending while standing and sitting.   2. Pt will demonstrate increased maximum isometric torque value by 25% when compared to the initial value resulting in improved ability to perform bending, lifting, and carrying activities safely, confidently.  3. Pt to demonstrate ability to independently control and reduce their pain through posture positioning and  mechanical movements throughout a typical day.  4.  Pt will demonstrate reduced pain and improved functional outcomes as reported on the FOTO by reaching a score of CI = at least 1% but < 20% impaired, limited or restricted or less in order to demonstrate subjective improvement in pt's condition.    5. Pt will demonstrate independence with the HEP at discharge  6.  Pt will be able to implement HEP and stretches independently enabling self control of symptoms.           Plan   Continue with established Plan of Care towards established PT goals.    [FreeTextEntry1] : I recommended that she call for the results of her yeast culture in 1 week.  I recommended that she avoid irritants and gave her a list of irritants to avoid.  I told her to try using Vaseline to improve the vulvas barrier function.  I also recommended that she try using vulvar estradiol cream again but this time gradually increase the dosage from once weekly.

## 2024-03-21 ENCOUNTER — APPOINTMENT (OUTPATIENT)
Dept: OBGYN | Facility: CLINIC | Age: 69
End: 2024-03-21
Payer: COMMERCIAL

## 2024-03-21 ENCOUNTER — APPOINTMENT (OUTPATIENT)
Dept: GYNECOLOGIC ONCOLOGY | Facility: CLINIC | Age: 69
End: 2024-03-21

## 2024-03-21 ENCOUNTER — NON-APPOINTMENT (OUTPATIENT)
Age: 69
End: 2024-03-21

## 2024-03-21 VITALS
SYSTOLIC BLOOD PRESSURE: 115 MMHG | WEIGHT: 112 LBS | DIASTOLIC BLOOD PRESSURE: 67 MMHG | HEIGHT: 63 IN | BODY MASS INDEX: 19.84 KG/M2

## 2024-03-21 DIAGNOSIS — D07.1 CARCINOMA IN SITU OF VULVA: ICD-10-CM

## 2024-03-21 DIAGNOSIS — N90.5 ATROPHY OF VULVA: ICD-10-CM

## 2024-03-21 DIAGNOSIS — Z98.890 OTHER SPECIFIED POSTPROCEDURAL STATES: ICD-10-CM

## 2024-03-21 DIAGNOSIS — L90.0 LICHEN SCLEROSUS ET ATROPHICUS: ICD-10-CM

## 2024-03-21 DIAGNOSIS — Z01.411 ENCOUNTER FOR GYNECOLOGICAL EXAMINATION (GENERAL) (ROUTINE) WITH ABNORMAL FINDINGS: ICD-10-CM

## 2024-03-21 DIAGNOSIS — R92.30 DENSE BREASTS, UNSPECIFIED: ICD-10-CM

## 2024-03-21 PROCEDURE — 99397 PER PM REEVAL EST PAT 65+ YR: CPT

## 2024-03-21 PROCEDURE — 99387 INIT PM E/M NEW PAT 65+ YRS: CPT

## 2024-03-21 PROCEDURE — 99212 OFFICE O/P EST SF 10 MIN: CPT | Mod: 25

## 2024-03-21 PROCEDURE — 82270 OCCULT BLOOD FECES: CPT

## 2024-03-21 RX ORDER — ESTRADIOL 10 UG/1
10 TABLET, FILM COATED VAGINAL
Qty: 24 | Refills: 3 | Status: ACTIVE | COMMUNITY
Start: 2024-03-21 | End: 1900-01-01

## 2024-03-21 NOTE — COUNSELING
[Nutrition/ Exercise/ Weight Management] : nutrition, exercise, weight management [Vitamins/Supplements] : vitamins/supplements [Confidentiality] : confidentiality [Breast Self Exam] : breast self exam [Vaccines] : vaccines

## 2024-03-21 NOTE — PHYSICAL EXAM
[Appropriately responsive] : appropriately responsive [Chaperone Present] : A chaperone was present in the examining room during all aspects of the physical examination [Alert] : alert [No Acute Distress] : no acute distress [No Lymphadenopathy] : no lymphadenopathy [No Murmurs] : no murmurs [Regular Rate Rhythm] : regular rate rhythm [Soft] : soft [Clear to Auscultation B/L] : clear to auscultation bilaterally [Non-tender] : non-tender [Non-distended] : non-distended [No HSM] : No HSM [No Lesions] : no lesions [Oriented x3] : oriented x3 [No Mass] : no mass [Examination Of The Breasts] : a normal appearance [No Masses] : no breast masses were palpable [Vulvar Atrophy] : vulvar atrophy [Labia Majora] : normal [Labia Minora] : normal [Atrophy] : atrophy [Normal] : normal [Uterine Adnexae] : normal [FreeTextEntry9] : Guaiac negative. No masses noted.  [FreeTextEntry1] : no active lichen sclerosus, no suspicious areas or lesions

## 2024-03-21 NOTE — HISTORY OF PRESENT ILLNESS
[Patient reported mammogram was normal] : Patient reported mammogram was normal [Patient reported breast sonogram was normal] : Patient reported breast sonogram was normal [Patient reported bone density results were abnormal] : Patient reported bone density results were abnormal [Patient reported colonoscopy was normal] : Patient reported colonoscopy was normal [FreeTextEntry1] : 2024. EDUIN HOLLEY 68 year old female  LMP PM. PMHx vulvar squamous cell carcinoma in situ, VIELKA-3 , LS, vulvodynia, hx of abnl pap, atrophy, osteopenia. SHx c/s x2, partial Simple Vulvectomy (), cone bx. She presents today to establish gynecologic care and offers no complaints.   Pt has vulvar squamous cell carcinoma, VIELKA 3, s/p Partial Simple Vulvectomy in . Additionally, she had abnl paps and a cone biopsy. No Hx of STD, PID, or IUD use for contraception. No ovarian cyst, uterine fibroid, endometriosis, pelvic infection or infertility.  She c/o vaginal dryness that bothers her on a daily basis. She has tried creams in the past but due to side effects such as increased discharge and not being strict with regime she had to d/c medication and/or not seen much improvement.   She uses clobetasol 3x/week for LS. She denies any recent LS flare up. She denies vaginal bleeding, abnormal vaginal discharge or vaginitis sxs. No urinary complaints. BM is normal per patient, no bloody stool. She denies abdominal and pelvic pain.  Pt is followed by Dr. Givens for vulvar squamous cell carcinoma in situ q 6 mos  GynHx: vulvar squamous cell carcinoma, VIELKA-3 , LS, vulvodynia, hx of abnl pap, atrophy PMH: HTN, T2DM, anxiety, osteopenia  SHx: c/s x2, partial Simple Vulvectomy (), cone bx Allergies: Sulfa: nausea, sensitivity to anesthesia  Meds: Valtrex, Atenolol, Avapro, Lipitor, Metformin, Ozempic, Clobetasol, Estradiol, Vascepa, potassium chloride. FHx: Mother: hysterectomy. maternal aunt:  from ovarian ca in 40s. Denies FHx of breast, uterine or colon cancer.  [Mammogramdate] : 10/2023 [TextBox_4] : pelvic sono-4/2023-wnl [BoneDensityDate] : 10/2023 [BreastSonogramDate] : 10/2023 [TextBox_37] : osteopenia  [ColonoscopyDate] : 6/2021

## 2024-03-21 NOTE — PLAN
[FreeTextEntry1] : 68 year old female presents for routine gyn exam - PMHx vulvar squamous cell carcinoma in situ, VIELKA-3 , s/p partial Simple Vulvectomy (2021), LS, vulvodynia, hx of abnl pap, s/p cone bx, osteopenia, atrophy BSE taught Breast and pelvic exam performed Pap/HPV conducted 10/2023 mammogram and breast sonogram. Rx given, due in 10/2024 06/2021 colonoscopy, due in 06/2026  Osteopenia: 10/2023 DEXA bone density, due in 10/2025 D/w pt Calcium 500 mg and Vitamin D 1000U intake, as well as weight-bearing exercise, such as walking, for 30 min daily  vulvar squamous cell carcinoma, continue to follow up w/ Dr. Givens q 6 mos  LS c/w clobetasol 3x/week balmex bid  Atrophy Discussed w/ pt estradiol tablets. Reviewed all r/b/a. Insert every night for 2 weeks, then 2x/week.  RTO in 3 months for ls check

## 2024-03-24 LAB — HPV HIGH+LOW RISK DNA PNL CVX: NOT DETECTED

## 2024-03-26 LAB — CYTOLOGY CVX/VAG DOC THIN PREP: ABNORMAL

## 2024-04-15 ENCOUNTER — NON-APPOINTMENT (OUTPATIENT)
Age: 69
End: 2024-04-15

## 2024-05-01 PROBLEM — D09.9 SQUAMOUS CELL CARCINOMA IN SITU: Status: ACTIVE | Noted: 2021-04-22

## 2024-05-01 PROBLEM — R93.89 THICKENED ENDOMETRIUM: Status: ACTIVE | Noted: 2022-03-10

## 2024-05-02 ENCOUNTER — APPOINTMENT (OUTPATIENT)
Dept: GYNECOLOGIC ONCOLOGY | Facility: CLINIC | Age: 69
End: 2024-05-02
Payer: COMMERCIAL

## 2024-05-02 ENCOUNTER — NON-APPOINTMENT (OUTPATIENT)
Age: 69
End: 2024-05-02

## 2024-05-02 VITALS
DIASTOLIC BLOOD PRESSURE: 82 MMHG | WEIGHT: 112 LBS | BODY MASS INDEX: 19.84 KG/M2 | HEART RATE: 71 BPM | SYSTOLIC BLOOD PRESSURE: 124 MMHG | HEIGHT: 63 IN

## 2024-05-02 DIAGNOSIS — D09.9 CARCINOMA IN SITU, UNSPECIFIED: ICD-10-CM

## 2024-05-02 DIAGNOSIS — R93.89 ABNORMAL FINDINGS ON DIAGNOSTIC IMAGING OF OTHER SPECIFIED BODY STRUCTURES: ICD-10-CM

## 2024-05-02 PROCEDURE — 99213 OFFICE O/P EST LOW 20 MIN: CPT | Mod: 25

## 2024-05-02 PROCEDURE — 56820 COLPOSCOPY VULVA: CPT

## 2024-05-28 ENCOUNTER — APPOINTMENT (OUTPATIENT)
Dept: ULTRASOUND IMAGING | Facility: CLINIC | Age: 69
End: 2024-05-28
Payer: COMMERCIAL

## 2024-05-28 PROCEDURE — 76856 US EXAM PELVIC COMPLETE: CPT

## 2024-05-28 PROCEDURE — 76830 TRANSVAGINAL US NON-OB: CPT

## 2024-06-13 RX ORDER — CLOBETASOL PROPIONATE 0.5 MG/G
0.05 OINTMENT TOPICAL DAILY
Qty: 30 | Refills: 0 | Status: ACTIVE | COMMUNITY
Start: 2023-06-30 | End: 1900-01-01

## 2024-06-21 NOTE — DISCUSSION/SUMMARY
[Reviewed Radiology Report(s)] : Radiology reports were reviewed. [FreeTextEntry1] : - exam findings reviewed -continue clobetasol 3x/week . - pelvic sono ordered -  risk of recurrent vulvar dysplasia, signs and symptoms and surveillance plan were reviewed. - f/u with Dr. Montgomery is scheduled. - She was advised to see me in 6 months. - All questions were answered to her apparent satisfaction.   Addendum: 6/21/24 : Uterus: 5.3 cm x 2.9 cm x 4.5 cm. Within normal limits. Endometrium: 4 mm. Within normal limits. Right ovary: 1.6 cm x 0.9 cm x 0.9 cm. Within normal limits. Left ovary: 1.5 cm x 1.7 cm x 1.8 cm. Within normal limits. Fluid: None. IMPRESSION: Normal pelvic sonogram. I discussed results with patient 6/21/24 via phone; she has an RPA 11/2024 ; all questions answered. LDS

## 2024-06-21 NOTE — LETTER BODY
[Dear  ___] : Dear  [unfilled], [FreeTextEntry2] : Her vulvar colposcopic exam is without evidence of recurrent dysplasia. She will see you for her other issues and will follow up with me in 6 months. Thank you again for referring this seth patient.

## 2024-06-21 NOTE — ASSESSMENT
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

## 2024-06-21 NOTE — HISTORY OF PRESENT ILLNESS
[FreeTextEntry1] : Ms. Guevara is a , LMP at about 57 years old initially referred by Dr. White for vulvar squamous cell carcinoma in situ. She presented to Dr. White with a right labia "sore." She had been seeing Dr. White for several years due to vulvodynia and lichen sclerosus.  2021 Right labium minus biopsy -squamous cell carcinoma in situ 2021- Partial Simple Vulvectomy, right periclitoral area- VIELKA 3  She underwent a workup for abdominal bloating and this included a pelvic sono by Dr. Rice; thickened endometrium of 7-8mm was reported 21 and normal ovaries with no FF.  She saw Dr. White and he did an in-office sono and an EMB and per patient these were negative. She has had no PMB, before nor after the biopsy.  22 sono: normal sono; EM 5mm - decreased from prior 2023 Sono normal-, EM 4mm Ordered this visit.  She transferred regular GYN care to Dr. Montgomery.  She was started on vaginal estrogen for vaginal atrophy with dryness.   She denies vulvar pruritus, discharge or pain, or any other associated signs or symptoms. She is using clobetasol 1-3x/week for lichen sclerosus.  HM: PAP: 3/2024 negative / HPV (-) ; h/o cone biopsy Mammo : 10/2023 BIRADS-1 negative Colonoscopy :  which was normal

## 2024-07-01 ENCOUNTER — NON-APPOINTMENT (OUTPATIENT)
Age: 69
End: 2024-07-01

## 2024-09-26 ENCOUNTER — APPOINTMENT (OUTPATIENT)
Dept: OBGYN | Facility: CLINIC | Age: 69
End: 2024-09-26
Payer: COMMERCIAL

## 2024-09-26 VITALS — SYSTOLIC BLOOD PRESSURE: 123 MMHG | DIASTOLIC BLOOD PRESSURE: 79 MMHG

## 2024-09-26 DIAGNOSIS — L90.0 LICHEN SCLEROSUS ET ATROPHICUS: ICD-10-CM

## 2024-09-26 DIAGNOSIS — N90.5 ATROPHY OF VULVA: ICD-10-CM

## 2024-09-26 DIAGNOSIS — N76.0 ACUTE VAGINITIS: ICD-10-CM

## 2024-09-26 DIAGNOSIS — D09.9 CARCINOMA IN SITU, UNSPECIFIED: ICD-10-CM

## 2024-09-26 PROCEDURE — 99214 OFFICE O/P EST MOD 30 MIN: CPT

## 2024-09-26 PROCEDURE — 99459 PELVIC EXAMINATION: CPT

## 2024-09-26 RX ORDER — ESTRADIOL 2 MG/1
7.5 SYSTEM VAGINAL
Qty: 1 | Refills: 3 | Status: ACTIVE | COMMUNITY
Start: 2024-09-26 | End: 1900-01-01

## 2024-09-26 NOTE — PHYSICAL EXAM
[Chaperone Present] : A chaperone was present in the examining room during all aspects of the physical examination [[id number]] : A chaperone was present during the pelvic exam. [Appropriately responsive] : appropriately responsive [Alert] : alert [No Acute Distress] : no acute distress [Vulvar Atrophy] : vulvar atrophy [Labia Majora] : normal [Labia Minora] : normal [Atrophy] : atrophy [Normal] : normal [Uterine Adnexae] : normal [FreeTextEntry2] : Shon Payne [FreeTextEntry1] : No evidence of active LS, no lesions or suspicious areas

## 2024-09-26 NOTE — HISTORY OF PRESENT ILLNESS
[FreeTextEntry1] : 09/26/2024. EDUIN HOLLEY 68-year-old female presents for follow-up visit, for LS check, vaginal atrophy.  Patient denies any recent LS flares. She uses Clobetasol twice a week.  She has been using Estradiol TABS intravaginally twice a week, for the past four months. She reports constant yellow-greenish vaginal discharge the first three months of starting. It has since resolved in the past 2-3 days. No associated vaginal itching or burning. She has not noticed any significant improvement in atrophy. Admits tearing on intercourse.  Pt under care of Dr. Givens given h/o vulvar squamous cell carcinoma, seen q6 months. Last seen in May. [TextBox_4] : Pelvic sono 6/24 [Mammogramdate] : 10/23 [BreastSonogramDate] : 10/23 [ColonoscopyDate] : 06/21

## 2024-09-26 NOTE — PLAN
[FreeTextEntry1] : LS: inactive, atrophy, h/o scc vulva- no lesions No active LS on examination C/w Clobetasol twice a week and maintenance w/ Balmex BID  Vaginal and vulvar Atrophy: not responsive to vagifem tabs Rx given for Estradiol cream, apply pea-size amount at opening nightly Rx given for Estring - all R/B reviewed  Vaginal Discharge: Vaginitis panel performed  HCM: Rx given for mammogram and breast sonogram 10/24 (last 10/23)  RTO in 3 months for LS check, or PRN

## 2024-09-30 ENCOUNTER — TRANSCRIPTION ENCOUNTER (OUTPATIENT)
Age: 69
End: 2024-09-30

## 2024-09-30 LAB
BV BACTERIA RRNA VAG QL NAA+PROBE: NOT DETECTED
C GLABRATA RNA VAG QL NAA+PROBE: NOT DETECTED
CANDIDA RRNA VAG QL PROBE: DETECTED
T VAGINALIS RRNA SPEC QL NAA+PROBE: NOT DETECTED

## 2024-10-01 DIAGNOSIS — B37.9 CANDIDIASIS, UNSPECIFIED: ICD-10-CM

## 2024-10-01 RX ORDER — TERCONAZOLE 8 MG/G
0.8 CREAM VAGINAL
Qty: 1 | Refills: 0 | Status: ACTIVE | COMMUNITY
Start: 2024-10-01 | End: 1900-01-01

## 2024-10-18 ENCOUNTER — APPOINTMENT (OUTPATIENT)
Dept: MAMMOGRAPHY | Facility: CLINIC | Age: 69
End: 2024-10-18
Payer: COMMERCIAL

## 2024-10-18 ENCOUNTER — APPOINTMENT (OUTPATIENT)
Dept: ULTRASOUND IMAGING | Facility: CLINIC | Age: 69
End: 2024-10-18
Payer: COMMERCIAL

## 2024-10-18 ENCOUNTER — RESULT REVIEW (OUTPATIENT)
Age: 69
End: 2024-10-18

## 2024-10-18 PROCEDURE — 77063 BREAST TOMOSYNTHESIS BI: CPT

## 2024-10-18 PROCEDURE — 77067 SCR MAMMO BI INCL CAD: CPT

## 2024-10-18 PROCEDURE — 76641 ULTRASOUND BREAST COMPLETE: CPT | Mod: 50

## 2024-11-09 ENCOUNTER — NON-APPOINTMENT (OUTPATIENT)
Age: 69
End: 2024-11-09

## 2024-11-14 ENCOUNTER — NON-APPOINTMENT (OUTPATIENT)
Age: 69
End: 2024-11-14

## 2024-11-14 ENCOUNTER — APPOINTMENT (OUTPATIENT)
Dept: GYNECOLOGIC ONCOLOGY | Facility: CLINIC | Age: 69
End: 2024-11-14
Payer: COMMERCIAL

## 2024-11-14 VITALS
OXYGEN SATURATION: 98 % | TEMPERATURE: 97.1 F | HEART RATE: 70 BPM | DIASTOLIC BLOOD PRESSURE: 73 MMHG | SYSTOLIC BLOOD PRESSURE: 125 MMHG | BODY MASS INDEX: 19.49 KG/M2 | WEIGHT: 110 LBS | HEIGHT: 63 IN

## 2024-11-14 DIAGNOSIS — D07.1 CARCINOMA IN SITU OF VULVA: ICD-10-CM

## 2024-11-14 DIAGNOSIS — R93.89 ABNORMAL FINDINGS ON DIAGNOSTIC IMAGING OF OTHER SPECIFIED BODY STRUCTURES: ICD-10-CM

## 2024-11-14 DIAGNOSIS — L90.0 LICHEN SCLEROSUS ET ATROPHICUS: ICD-10-CM

## 2024-11-14 PROCEDURE — 99459 PELVIC EXAMINATION: CPT

## 2024-11-14 PROCEDURE — 56821 COLPOSCOPY VULVA W/BIOPSY: CPT

## 2024-11-14 PROCEDURE — 99213 OFFICE O/P EST LOW 20 MIN: CPT | Mod: 25

## 2024-11-22 LAB — CORE LAB BIOPSY: NORMAL

## 2024-11-27 ENCOUNTER — TRANSCRIPTION ENCOUNTER (OUTPATIENT)
Age: 69
End: 2024-11-27

## 2025-01-14 ENCOUNTER — RX RENEWAL (OUTPATIENT)
Age: 70
End: 2025-01-14

## 2025-03-01 ENCOUNTER — NON-APPOINTMENT (OUTPATIENT)
Age: 70
End: 2025-03-01

## 2025-05-13 ENCOUNTER — RX RENEWAL (OUTPATIENT)
Age: 70
End: 2025-05-13

## 2025-05-15 ENCOUNTER — NON-APPOINTMENT (OUTPATIENT)
Age: 70
End: 2025-05-15

## 2025-05-15 ENCOUNTER — APPOINTMENT (OUTPATIENT)
Dept: GYNECOLOGIC ONCOLOGY | Facility: CLINIC | Age: 70
End: 2025-05-15

## 2025-05-15 VITALS
TEMPERATURE: 97 F | OXYGEN SATURATION: 98 % | WEIGHT: 113 LBS | BODY MASS INDEX: 20.02 KG/M2 | SYSTOLIC BLOOD PRESSURE: 117 MMHG | DIASTOLIC BLOOD PRESSURE: 74 MMHG | HEIGHT: 63 IN | HEART RATE: 70 BPM

## 2025-05-15 DIAGNOSIS — N90.5 ATROPHY OF VULVA: ICD-10-CM

## 2025-05-15 DIAGNOSIS — D07.1 CARCINOMA IN SITU OF VULVA: ICD-10-CM

## 2025-05-15 DIAGNOSIS — L90.0 LICHEN SCLEROSUS ET ATROPHICUS: ICD-10-CM

## 2025-05-15 PROCEDURE — 99459 PELVIC EXAMINATION: CPT

## 2025-05-15 PROCEDURE — 56820 COLPOSCOPY VULVA: CPT

## 2025-05-15 PROCEDURE — 99213 OFFICE O/P EST LOW 20 MIN: CPT | Mod: 25

## 2025-05-18 LAB — HPV HIGH+LOW RISK DNA PNL CVX: NOT DETECTED

## 2025-05-20 LAB — CYTOLOGY CVX/VAG DOC THIN PREP: ABNORMAL

## 2025-08-11 ENCOUNTER — APPOINTMENT (OUTPATIENT)
Dept: OBGYN | Facility: CLINIC | Age: 70
End: 2025-08-11

## 2025-08-11 VITALS
HEIGHT: 63 IN | SYSTOLIC BLOOD PRESSURE: 119 MMHG | WEIGHT: 112 LBS | DIASTOLIC BLOOD PRESSURE: 69 MMHG | BODY MASS INDEX: 19.84 KG/M2

## 2025-08-11 DIAGNOSIS — N90.5 ATROPHY OF VULVA: ICD-10-CM

## 2025-08-11 DIAGNOSIS — D07.1 CARCINOMA IN SITU OF VULVA: ICD-10-CM

## 2025-08-11 DIAGNOSIS — Z98.890 OTHER SPECIFIED POSTPROCEDURAL STATES: ICD-10-CM

## 2025-08-11 DIAGNOSIS — Z01.411 ENCOUNTER FOR GYNECOLOGICAL EXAMINATION (GENERAL) (ROUTINE) WITH ABNORMAL FINDINGS: ICD-10-CM

## 2025-08-11 DIAGNOSIS — R92.30 DENSE BREASTS, UNSPECIFIED: ICD-10-CM

## 2025-08-11 DIAGNOSIS — L90.0 LICHEN SCLEROSUS ET ATROPHICUS: ICD-10-CM

## 2025-08-11 PROCEDURE — 99459 PELVIC EXAMINATION: CPT | Mod: NC

## 2025-08-11 PROCEDURE — 82270 OCCULT BLOOD FECES: CPT

## 2025-08-11 PROCEDURE — 99397 PER PM REEVAL EST PAT 65+ YR: CPT

## 2025-08-11 PROCEDURE — 99213 OFFICE O/P EST LOW 20 MIN: CPT | Mod: 25

## 2025-09-04 ENCOUNTER — RX RENEWAL (OUTPATIENT)
Age: 70
End: 2025-09-04

## 2025-09-08 ENCOUNTER — APPOINTMENT (OUTPATIENT)
Dept: OBGYN | Facility: CLINIC | Age: 70
End: 2025-09-08
Payer: COMMERCIAL

## 2025-09-08 VITALS — DIASTOLIC BLOOD PRESSURE: 88 MMHG | SYSTOLIC BLOOD PRESSURE: 146 MMHG

## 2025-09-08 DIAGNOSIS — N89.8 OTHER SPECIFIED NONINFLAMMATORY DISORDERS OF VAGINA: ICD-10-CM

## 2025-09-08 PROCEDURE — 99459 PELVIC EXAMINATION: CPT

## 2025-09-08 PROCEDURE — 99213 OFFICE O/P EST LOW 20 MIN: CPT

## 2025-09-08 RX ORDER — FLUCONAZOLE 150 MG/1
150 TABLET ORAL
Qty: 2 | Refills: 0 | Status: ACTIVE | COMMUNITY
Start: 2025-09-08 | End: 1900-01-01

## 2025-09-08 RX ORDER — CLOTRIMAZOLE AND BETAMETHASONE DIPROPIONATE 10; .5 MG/G; MG/G
1-0.05 CREAM TOPICAL TWICE DAILY
Qty: 1 | Refills: 2 | Status: ACTIVE | COMMUNITY
Start: 2025-09-08 | End: 1900-01-01

## 2025-09-09 LAB
CANDIDA VAG CYTO: DETECTED
G VAGINALIS+PREV SP MTYP VAG QL MICRO: NOT DETECTED
T VAGINALIS VAG QL WET PREP: NOT DETECTED

## 2025-09-11 ENCOUNTER — NON-APPOINTMENT (OUTPATIENT)
Age: 70
End: 2025-09-11